# Patient Record
Sex: MALE | Race: WHITE | NOT HISPANIC OR LATINO | Employment: FULL TIME | URBAN - METROPOLITAN AREA
[De-identification: names, ages, dates, MRNs, and addresses within clinical notes are randomized per-mention and may not be internally consistent; named-entity substitution may affect disease eponyms.]

---

## 2025-03-16 ENCOUNTER — HOSPITAL ENCOUNTER (EMERGENCY)
Facility: HOSPITAL | Age: 55
Discharge: HOME | End: 2025-03-17
Attending: STUDENT IN AN ORGANIZED HEALTH CARE EDUCATION/TRAINING PROGRAM
Payer: COMMERCIAL

## 2025-03-16 DIAGNOSIS — R11.0 NAUSEA: ICD-10-CM

## 2025-03-16 DIAGNOSIS — R10.9 FLANK PAIN: ICD-10-CM

## 2025-03-16 DIAGNOSIS — K86.9 PANCREATIC LESION (HHS-HCC): Primary | ICD-10-CM

## 2025-03-16 PROCEDURE — 99285 EMERGENCY DEPT VISIT HI MDM: CPT | Performed by: STUDENT IN AN ORGANIZED HEALTH CARE EDUCATION/TRAINING PROGRAM

## 2025-03-16 RX ORDER — ONDANSETRON HYDROCHLORIDE 2 MG/ML
4 INJECTION, SOLUTION INTRAVENOUS ONCE
Status: COMPLETED | OUTPATIENT
Start: 2025-03-16 | End: 2025-03-17

## 2025-03-16 RX ORDER — MORPHINE SULFATE 4 MG/ML
4 INJECTION, SOLUTION INTRAMUSCULAR; INTRAVENOUS ONCE
Status: COMPLETED | OUTPATIENT
Start: 2025-03-16 | End: 2025-03-17

## 2025-03-16 ASSESSMENT — PAIN - FUNCTIONAL ASSESSMENT: PAIN_FUNCTIONAL_ASSESSMENT: 0-10

## 2025-03-16 ASSESSMENT — COLUMBIA-SUICIDE SEVERITY RATING SCALE - C-SSRS
6. HAVE YOU EVER DONE ANYTHING, STARTED TO DO ANYTHING, OR PREPARED TO DO ANYTHING TO END YOUR LIFE?: NO
2. HAVE YOU ACTUALLY HAD ANY THOUGHTS OF KILLING YOURSELF?: NO
1. IN THE PAST MONTH, HAVE YOU WISHED YOU WERE DEAD OR WISHED YOU COULD GO TO SLEEP AND NOT WAKE UP?: NO

## 2025-03-16 ASSESSMENT — LIFESTYLE VARIABLES
HAVE YOU EVER FELT YOU SHOULD CUT DOWN ON YOUR DRINKING: NO
EVER FELT BAD OR GUILTY ABOUT YOUR DRINKING: NO
TOTAL SCORE: 0
EVER HAD A DRINK FIRST THING IN THE MORNING TO STEADY YOUR NERVES TO GET RID OF A HANGOVER: NO
HAVE PEOPLE ANNOYED YOU BY CRITICIZING YOUR DRINKING: NO

## 2025-03-17 ENCOUNTER — APPOINTMENT (OUTPATIENT)
Dept: RADIOLOGY | Facility: HOSPITAL | Age: 55
End: 2025-03-17
Payer: COMMERCIAL

## 2025-03-17 ENCOUNTER — APPOINTMENT (OUTPATIENT)
Dept: CARDIOLOGY | Facility: HOSPITAL | Age: 55
End: 2025-03-17
Payer: COMMERCIAL

## 2025-03-17 VITALS
DIASTOLIC BLOOD PRESSURE: 82 MMHG | WEIGHT: 240 LBS | BODY MASS INDEX: 36.37 KG/M2 | OXYGEN SATURATION: 96 % | TEMPERATURE: 97.5 F | HEIGHT: 68 IN | HEART RATE: 55 BPM | RESPIRATION RATE: 18 BRPM | SYSTOLIC BLOOD PRESSURE: 141 MMHG

## 2025-03-17 LAB
ALBUMIN SERPL BCP-MCNC: 4.4 G/DL (ref 3.4–5)
ALP SERPL-CCNC: 97 U/L (ref 33–120)
ALT SERPL W P-5'-P-CCNC: 81 U/L (ref 10–52)
ANION GAP SERPL CALC-SCNC: 13 MMOL/L (ref 10–20)
APPEARANCE UR: CLEAR
AST SERPL W P-5'-P-CCNC: 42 U/L (ref 9–39)
ATRIAL RATE: 55 BPM
BASOPHILS # BLD AUTO: 0.05 X10*3/UL (ref 0–0.1)
BASOPHILS NFR BLD AUTO: 0.4 %
BILIRUB SERPL-MCNC: 0.9 MG/DL (ref 0–1.2)
BILIRUB UR STRIP.AUTO-MCNC: NEGATIVE MG/DL
BUN SERPL-MCNC: 19 MG/DL (ref 6–23)
CALCIUM SERPL-MCNC: 9.5 MG/DL (ref 8.6–10.3)
CHLORIDE SERPL-SCNC: 103 MMOL/L (ref 98–107)
CO2 SERPL-SCNC: 25 MMOL/L (ref 21–32)
COLOR UR: ABNORMAL
CREAT SERPL-MCNC: 1.08 MG/DL (ref 0.5–1.3)
EGFRCR SERPLBLD CKD-EPI 2021: 83 ML/MIN/1.73M*2
EOSINOPHIL # BLD AUTO: 0.33 X10*3/UL (ref 0–0.7)
EOSINOPHIL NFR BLD AUTO: 2.5 %
ERYTHROCYTE [DISTWIDTH] IN BLOOD BY AUTOMATED COUNT: 13.8 % (ref 11.5–14.5)
GLUCOSE SERPL-MCNC: 150 MG/DL (ref 74–99)
GLUCOSE UR STRIP.AUTO-MCNC: NORMAL MG/DL
HCT VFR BLD AUTO: 46.6 % (ref 41–52)
HGB BLD-MCNC: 15 G/DL (ref 13.5–17.5)
HOLD SPECIMEN: NORMAL
IMM GRANULOCYTES # BLD AUTO: 0.05 X10*3/UL (ref 0–0.7)
IMM GRANULOCYTES NFR BLD AUTO: 0.4 % (ref 0–0.9)
KETONES UR STRIP.AUTO-MCNC: NEGATIVE MG/DL
LEUKOCYTE ESTERASE UR QL STRIP.AUTO: NEGATIVE
LIPASE SERPL-CCNC: 16 U/L (ref 9–82)
LYMPHOCYTES # BLD AUTO: 1.96 X10*3/UL (ref 1.2–4.8)
LYMPHOCYTES NFR BLD AUTO: 15 %
MCH RBC QN AUTO: 28 PG (ref 26–34)
MCHC RBC AUTO-ENTMCNC: 32.2 G/DL (ref 32–36)
MCV RBC AUTO: 87 FL (ref 80–100)
MONOCYTES # BLD AUTO: 0.75 X10*3/UL (ref 0.1–1)
MONOCYTES NFR BLD AUTO: 5.7 %
MUCOUS THREADS #/AREA URNS AUTO: NORMAL /LPF
NEUTROPHILS # BLD AUTO: 9.96 X10*3/UL (ref 1.2–7.7)
NEUTROPHILS NFR BLD AUTO: 76 %
NITRITE UR QL STRIP.AUTO: NEGATIVE
NRBC BLD-RTO: 0 /100 WBCS (ref 0–0)
P AXIS: 43 DEGREES
P OFFSET: 194 MS
P ONSET: 133 MS
PH UR STRIP.AUTO: 5 [PH]
PLATELET # BLD AUTO: 198 X10*3/UL (ref 150–450)
POTASSIUM SERPL-SCNC: 4.7 MMOL/L (ref 3.5–5.3)
PR INTERVAL: 180 MS
PROT SERPL-MCNC: 7.6 G/DL (ref 6.4–8.2)
PROT UR STRIP.AUTO-MCNC: NEGATIVE MG/DL
Q ONSET: 223 MS
QRS COUNT: 9 BEATS
QRS DURATION: 88 MS
QT INTERVAL: 424 MS
QTC CALCULATION(BAZETT): 405 MS
QTC FREDERICIA: 411 MS
R AXIS: 49 DEGREES
RBC # BLD AUTO: 5.35 X10*6/UL (ref 4.5–5.9)
RBC # UR STRIP.AUTO: ABNORMAL MG/DL
RBC #/AREA URNS AUTO: NORMAL /HPF
SODIUM SERPL-SCNC: 136 MMOL/L (ref 136–145)
SP GR UR STRIP.AUTO: >1.05
T AXIS: 55 DEGREES
T OFFSET: 435 MS
UROBILINOGEN UR STRIP.AUTO-MCNC: NORMAL MG/DL
VENTRICULAR RATE: 55 BPM
WBC # BLD AUTO: 13.1 X10*3/UL (ref 4.4–11.3)
WBC #/AREA URNS AUTO: NORMAL /HPF

## 2025-03-17 PROCEDURE — 74177 CT ABD & PELVIS W/CONTRAST: CPT

## 2025-03-17 PROCEDURE — 96375 TX/PRO/DX INJ NEW DRUG ADDON: CPT

## 2025-03-17 PROCEDURE — 96361 HYDRATE IV INFUSION ADD-ON: CPT

## 2025-03-17 PROCEDURE — 81001 URINALYSIS AUTO W/SCOPE: CPT

## 2025-03-17 PROCEDURE — 96374 THER/PROPH/DIAG INJ IV PUSH: CPT | Mod: 59

## 2025-03-17 PROCEDURE — 76705 ECHO EXAM OF ABDOMEN: CPT | Performed by: RADIOLOGY

## 2025-03-17 PROCEDURE — 74177 CT ABD & PELVIS W/CONTRAST: CPT | Performed by: RADIOLOGY

## 2025-03-17 PROCEDURE — 93005 ELECTROCARDIOGRAM TRACING: CPT

## 2025-03-17 PROCEDURE — 2500000004 HC RX 250 GENERAL PHARMACY W/ HCPCS (ALT 636 FOR OP/ED)

## 2025-03-17 PROCEDURE — 85025 COMPLETE CBC W/AUTO DIFF WBC: CPT

## 2025-03-17 PROCEDURE — 83690 ASSAY OF LIPASE: CPT

## 2025-03-17 PROCEDURE — 76705 ECHO EXAM OF ABDOMEN: CPT

## 2025-03-17 PROCEDURE — 36415 COLL VENOUS BLD VENIPUNCTURE: CPT

## 2025-03-17 PROCEDURE — 2550000001 HC RX 255 CONTRASTS: Performed by: STUDENT IN AN ORGANIZED HEALTH CARE EDUCATION/TRAINING PROGRAM

## 2025-03-17 PROCEDURE — 80053 COMPREHEN METABOLIC PANEL: CPT

## 2025-03-17 RX ORDER — ONDANSETRON 4 MG/1
4 TABLET, ORALLY DISINTEGRATING ORAL EVERY 8 HOURS PRN
Qty: 15 TABLET | Refills: 0 | Status: SHIPPED | OUTPATIENT
Start: 2025-03-17

## 2025-03-17 RX ADMIN — SODIUM CHLORIDE, POTASSIUM CHLORIDE, SODIUM LACTATE AND CALCIUM CHLORIDE 1000 ML: 600; 310; 30; 20 INJECTION, SOLUTION INTRAVENOUS at 00:27

## 2025-03-17 RX ADMIN — MORPHINE SULFATE 4 MG: 4 INJECTION, SOLUTION INTRAMUSCULAR; INTRAVENOUS at 00:27

## 2025-03-17 RX ADMIN — ONDANSETRON 4 MG: 2 INJECTION INTRAMUSCULAR; INTRAVENOUS at 00:27

## 2025-03-17 RX ADMIN — IOHEXOL 75 ML: 350 INJECTION, SOLUTION INTRAVENOUS at 01:09

## 2025-03-17 ASSESSMENT — PAIN SCALES - GENERAL
PAINLEVEL_OUTOF10: 3
PAINLEVEL_OUTOF10: 4

## 2025-03-17 NOTE — DISCHARGE INSTRUCTIONS
Use Zofran as needed for nausea and vomiting.  You may use your home naproxen for pain control.  Focus on hydration and simple foods that you can tolerate.  If you develop uncontrollable pain or other worrisome symptoms, return to the ER.

## 2025-03-17 NOTE — ED PROVIDER NOTES
EMERGENCY DEPARTMENT ENCOUNTER      Pt Name: Gabe Rdz  MRN: [id number]  Birthdate 1970  Date of evaluation: 3/16/2025  Provider: Mejia Vo MD    CHIEF COMPLAINT       Chief Complaint   Patient presents with    Flank Pain     Pt has R sided flank pain starting around 2100. Pt is diaphoretic with 6/10 pain. Pt denies chest pain, endorses slight sob. Pt states he recently just finished abx for c diff. Pt has history of kidney stones. Pt states he has not been able to urinate; last time 1100/1200    Nausea         HISTORY OF PRESENT ILLNESS    HPI  Patient is a 54-year-old male with a history of kidney stones, HTN, HLD presenting with right flank pain.  This been ongoing since approximately 9 PM tonight.  He was sitting down not engaged in activity when this occurred.  Pain is 6/10, crampy, not rating, constant, without consistent alleviating or exacerbating factors.  He notes nausea without vomiting.  He has had sweats.  He has not been able to pee since 11 PM.  Notably, he just finished a course of oral vancomycin for C. difficile incurred from clindamycin given for a dental infection.  He denies fevers, chest pain, shortness of breath, diarrhea, constipation, black or bloody stools, dysuria, hematuria.    Nursing Notes were reviewed.    PAST MEDICAL HISTORY     Past Medical History:   Diagnosis Date    Hypertension     Kidney calculi     Other specified health status 03/14/2018    No pertinent past medical history         SURGICAL HISTORY     History reviewed. No pertinent surgical history.      CURRENT MEDICATIONS       Discharge Medication List as of 3/17/2025  2:47 AM          ALLERGIES     Penicillins    FAMILY HISTORY     No family history on file.       SOCIAL HISTORY       Social History     Socioeconomic History    Marital status:    Tobacco Use    Smoking status: Never    Smokeless tobacco: Never   Substance and Sexual Activity    Alcohol use: Not Currently    Drug use: Never     Social  Drivers of Health     Financial Resource Strain: Low Risk  (1/22/2025)    Received from [facility]    Overall Financial Resource Strain (CARDIA)     Difficulty of Paying Living Expenses: Not hard at all   Food Insecurity: No Food Insecurity (1/22/2025)    Received from [facility]    Hunger Vital Sign     Worried About Running Out of Food in the Last Year: Never true     Ran Out of Food in the Last Year: Never true   Transportation Needs: No Transportation Needs (1/22/2025)    Received from [facility]    PRAPARE - Transportation     Lack of Transportation (Medical): No     Lack of Transportation (Non-Medical): No   Physical Activity: Insufficiently Active (1/22/2025)    Received from [facility]    Exercise Vital Sign     Days of Exercise per Week: 3 days     Minutes of Exercise per Session: 40 min   Stress: No Stress Concern Present (1/22/2025)    Received from [facility]    Citizen of [place] of Occupational [employer] - Occupational Stress Questionnaire     Feeling of Stress : Not at all   Social Connections: Unknown (1/22/2025)    Received from [facility]    Social Connection and Isolation Panel [NHANES]     Frequency of Communication with Friends and Family: Patient declined     Frequency of Social Gatherings with Friends and Family: Patient declined     Attends Buddhism Services: Patient declined     Active Member of Clubs or Organizations: Patient declined     Attends Club or Organization Meetings: Patient declined     Marital Status:    Housing Stability: Low Risk  (10/11/2023)    Received from [facility]    Housing Stability Vital Sign     Unable to Pay for Housing in the Last Year: No     Number of Places Lived in the Last Year: 1     Unstable Housing in the Last Year: No       SCREENINGS                        PHYSICAL EXAM    (up to 7 for level 4, 8 or more for level 5)     ED Triage Vitals [03/16/25 4744]   Temperature Heart Rate Respirations BP   35.5 °C (95.9  °F) 65 18 (!) 190/102      Pulse Ox Temp src Heart Rate Source Patient Position   97 % -- -- --      BP Location FiO2 (%)     -- --       Physical Exam  Constitutional:       Appearance: He is not toxic-appearing.      Comments: Appears uncomfortable   HENT:      Head: Normocephalic and atraumatic.      Nose: Nose normal.      Mouth/Throat:      Mouth: Mucous membranes are moist.   Eyes:      Extraocular Movements: Extraocular movements intact.      Conjunctiva/sclera: Conjunctivae normal.   Cardiovascular:      Rate and Rhythm: Normal rate and regular rhythm.      Heart sounds: Normal heart sounds.   Pulmonary:      Effort: Pulmonary effort is normal. No respiratory distress.      Breath sounds: Normal breath sounds.   Abdominal:      General: There is no distension.      Palpations: Abdomen is soft.      Tenderness: There is right CVA tenderness. There is no left CVA tenderness or rebound.      Comments: Tender to palpation in the right upper quadrant with positive Scott sign.   Musculoskeletal:         General: No deformity or signs of injury.      Cervical back: Normal range of motion and neck supple.      Right lower leg: No edema.      Left lower leg: No edema.   Skin:     General: Skin is warm and dry.      Capillary Refill: Capillary refill takes less than 2 seconds.   Neurological:      General: No focal deficit present.          DIAGNOSTIC RESULTS     LABS:  Labs Reviewed   CBC WITH AUTO DIFFERENTIAL - Abnormal       Result Value    WBC 13.1 (*)     nRBC 0.0      RBC 5.35      Hemoglobin 15.0      Hematocrit 46.6      MCV 87      MCH 28.0      MCHC 32.2      RDW 13.8      Platelets 198      Neutrophils % 76.0      Immature Granulocytes %, Automated 0.4      Lymphocytes % 15.0      Monocytes % 5.7      Eosinophils % 2.5      Basophils % 0.4      Neutrophils Absolute 9.96 (*)     Immature Granulocytes Absolute, Automated 0.05      Lymphocytes Absolute 1.96      Monocytes Absolute 0.75      Eosinophils  Absolute 0.33      Basophils Absolute 0.05     COMPREHENSIVE METABOLIC PANEL - Abnormal    Glucose 150 (*)     Sodium 136      Potassium 4.7      Chloride 103      Bicarbonate 25      Anion Gap 13      Urea Nitrogen 19      Creatinine 1.08      eGFR 83      Calcium 9.5      Albumin 4.4      Alkaline Phosphatase 97      Total Protein 7.6      AST 42 (*)     Bilirubin, Total 0.9      ALT 81 (*)    URINALYSIS WITH REFLEX CULTURE AND MICROSCOPIC - Abnormal    Color, Urine Light-Yellow      Appearance, Urine Clear      Specific Gravity, Urine >1.050 (*)     pH, Urine 5.0      Protein, Urine NEGATIVE      Glucose, Urine Normal      Blood, Urine 0.03 (TRACE) (*)     Ketones, Urine NEGATIVE      Bilirubin, Urine NEGATIVE      Urobilinogen, Urine Normal      Nitrite, Urine NEGATIVE      Leukocyte Esterase, Urine NEGATIVE     LIPASE - Normal    Lipase 16      Narrative:     Venipuncture immediately after or during the administration of Metamizole may lead to falsely low results. Testing should be performed immediately prior to Metamizole dosing.   URINALYSIS WITH REFLEX CULTURE AND MICROSCOPIC    Narrative:     The following orders were created for panel order Urinalysis with Reflex Culture and Microscopic.  Procedure                               Abnormality         Status                     ---------                               -----------         ------                     Urinalysis with Reflex C...[[id number]]  Abnormal            Final result               Extra Urine Gray Tube[[id number]]                            In process                   Please view results for these tests on the individual orders.   EXTRA URINE GRAY TUBE   URINALYSIS MICROSCOPIC WITH REFLEX CULTURE    WBC, Urine NONE      RBC, Urine 1-2      Mucus, Urine FEW         All other labs were within normal range or not returned as of this dictation.    Imaging  US right upper quadrant   Final Result   Unremarkable right upper quadrant ultrasound.         MACRO:   None.        Signed by: Evan Finkelstein 3/17/2025 2:37 AM   Dictation workstation:   [id number]      CT abdomen pelvis w IV contrast   Final Result   1. Mild nonspecific gallbladder wall thickening near the fundus   without appreciable radiopaque stone. Recommend ultrasound for   further assessment.   2. Subtle area of low attenuation in the pancreatic head. Follow-up   MRI is recommended to exclude underlying lesion.        MACRO:   None        Signed by: Melvin Villafana 3/17/2025 1:35 AM   Dictation workstation:   [id number]           Procedures  Procedures     EMERGENCY DEPARTMENT COURSE/MDM:     ED Course as of 03/17/25 0257   Mon Mar 17, 2025   0106 EKG performed at 01: 01 and independently reviewed by provider: Reveals sinus bradycardia with a rate of 55 bpm, normal axis, normal intervals, no ST changes, no T wave abnormalities, no ectopy. No STEMI. [TL]   0140 IMPRESSION:  1. Mild nonspecific gallbladder wall thickening near the fundus  without appreciable radiopaque stone. Recommend ultrasound for  further assessment.  2. Subtle area of low attenuation in the pancreatic head. Follow-up  MRI is recommended to exclude underlying lesion.   [TL]   0152 Ultrasound to be obtained.  Patient pain is well-controlled on repeat assessment.  Patient advised to provide urine sample.  Bedside nurse made aware of need for urine sample. [TL]   0240 Ultrasound the right upper quadrant is unremarkable.  Based on this I do not suspect cholelithiasis.  Patient be discharged home.  Recommend NSAIDs and Tylenol. [TL]   0248 Patient made aware of pancreatic hypodensity need to follow-up as an outpatient for likely MRI for further evaluation.  Copy of CT report printed off and provided to patient at bedside. [TL]      ED Course User Index  [TL] Doug Lainez DO         Diagnoses as of 03/17/25 0257   Pancreatic lesion (HHS-HCC)   Flank pain   Nausea        Medical Decision Making  History obtained from the patient.   Records including labs, imaging, notes independently reviewed by me.  Presentation concerning for possible kidney stone, UTI, cholecystitis, choledocholithiasis.  Patient given Zofran, morphine with improvement in pain.  Was also given a liter fluid bolus to encourage urination.  CBC with a leukocytosis of 13.1 otherwise unremarkable.  CMP notable for mild transaminitis with AST of 42, ALT of 81.  Renal function normal.  No profound electrolyte abnormalities.  Mildly hyperglycemic to glucose of 150 thought to be clinically noncontributory.  Lipase within normal limits.  Urinalysis with trace blood only.  CT of the abdomen and pelvis demonstrated nonspecific perinephric fat stranding bilaterally, nonspecific wall thickening of the gallbladder, nonspecific lesion in the head of the pancreas..  Ultrasound of the right upper quadrant was obtained which was negative.  On reevaluation, patient is hemodynamically stable, with pain well-controlled.  Given the nonspecific perinephric fat stranding and the trace blood in the urine, it is possible he passed a stone.  Given that the patient is feeling significantly better, he is to be discharged home in satisfactory condition.  He already has medication for pain control at home.  He was given a prescription for Zofran, referral to [facility] for the incidentally found pancreatic lesion.  All questions answered and return precautions discussed.    EKG demonstrates sinus bradycardia at a rate of 55, normal intervals.  No acute injury pattern.    Patient and or family in agreement and understanding of treatment plan.  All questions answered.      I reviewed the case with the attending ED physician. The attending ED physician agrees with the plan. Patient and/or patient´s representative was counseled regarding labs, imaging, likely diagnosis, and plan. All questions were answered.    ED Medications administered this visit:    Medications   ondansetron (Zofran) injection 4  mg (4 mg intravenous Given 3/17/25 0027)   morphine injection 4 mg (4 mg intravenous Given 3/17/25 0027)   lactated Ringer's bolus 1,000 mL (0 mL intravenous Stopped 3/17/25 0234)   iohexol (OMNIPaque) 350 mg iodine/mL solution 75 mL (75 mL intravenous Given 3/17/25 0109)       New Prescriptions from this visit:    Discharge Medication List as of 3/17/2025  2:47 AM        START taking these medications    Details   ondansetron ODT (Zofran-ODT) 4 mg disintegrating tablet Dissolve 1 tablet (4 mg) in the mouth every 8 hours if needed for vomiting or nausea for up to 15 doses., Starting Mon 3/17/2025, Normal             Follow-up:  [facility]  [address]  [address] [facility] [address] [phone]  Schedule an appointment as soon as possible for a visit           Final Impression:   1. Pancreatic lesion (HHS-HCC)    2. Flank pain    3. Nausea          (Please note that portions of this note were completed with a voice recognition program.  Efforts were made to edit the dictations but occasionally words are mis-transcribed.)     Mejia Vo MD  Resident  03/17/25 0257

## 2025-03-19 ENCOUNTER — TELEPHONE (OUTPATIENT)
Dept: RADIOLOGY | Facility: HOSPITAL | Age: 55
End: 2025-03-19
Payer: COMMERCIAL

## 2025-03-19 NOTE — TELEPHONE ENCOUNTER
"Gabe is listed on the incidental findings list based on imaging completed 3/17/2025 in which radiology noted a \"subtle area of ill-defined low-attenuation in the pancreatic head measuring roughly 2.2 cm.\" Radiology recommended a follow up MRI to be completed. Gabe has a visit with Lennie Siu PA-C on 3/24/2025 from the pancreas clinic.   "

## 2025-03-24 ENCOUNTER — OFFICE VISIT (OUTPATIENT)
Dept: SURGICAL ONCOLOGY | Facility: CLINIC | Age: 55
End: 2025-03-24
Payer: COMMERCIAL

## 2025-03-24 VITALS
SYSTOLIC BLOOD PRESSURE: 153 MMHG | BODY MASS INDEX: 36.95 KG/M2 | HEART RATE: 65 BPM | DIASTOLIC BLOOD PRESSURE: 87 MMHG | WEIGHT: 243.83 LBS | HEIGHT: 68 IN

## 2025-03-24 DIAGNOSIS — D37.8 NEOPLASM OF UNCERTAIN BEHAVIOR OF HEAD OF PANCREAS: Primary | ICD-10-CM

## 2025-03-24 PROCEDURE — 99204 OFFICE O/P NEW MOD 45 MIN: CPT | Performed by: PHYSICIAN ASSISTANT

## 2025-03-24 PROCEDURE — 99214 OFFICE O/P EST MOD 30 MIN: CPT | Performed by: PHYSICIAN ASSISTANT

## 2025-03-24 PROCEDURE — 3008F BODY MASS INDEX DOCD: CPT | Performed by: PHYSICIAN ASSISTANT

## 2025-03-24 RX ORDER — ATORVASTATIN CALCIUM 80 MG/1
80 TABLET, FILM COATED ORAL
COMMUNITY
Start: 2025-01-27

## 2025-03-24 RX ORDER — MULTIVITAMIN
1 TABLET ORAL
COMMUNITY
Start: 2023-10-12

## 2025-03-24 RX ORDER — METOPROLOL SUCCINATE 25 MG/1
25 TABLET, EXTENDED RELEASE ORAL DAILY
COMMUNITY

## 2025-03-24 ASSESSMENT — PAIN SCALES - GENERAL: PAINLEVEL_OUTOF10: 0-NO PAIN

## 2025-03-24 NOTE — PROGRESS NOTES
Subjective   Mr. Rdz is a 54-year-old male who is here to discuss abnormal pancreas imaging.     He was seen in the [facility] ED on 3/16/25 with right-sided flank pain and nausea. Blood work was notable for normal lipase, AST 42, ALT 81, ALP 97, T bili 0.9 and WBC of 13.1 K.     He had a CT A/P with contrast that showed a subtle area of ill-defined low-attenuation in the pancreatic head measuring roughly 2.2 cm and mild nonspecific GB wall thickening near the fundus without appreciable stone. He then had a RUQ US that showed normal liver, normal biliary tree and normal GB.     He was referred to the [facility] who asked that I see him.     He denies a personal history of acute pancreatitis.     He was recently diagnosed with C difficile after taking Clindamycin after a dental procedure. He was treated with Vancomycin. During this episode, he lost 10 pounds. He also complains of early satiety and bloating.     He denies a known history of liver disease. He is up to date with screening colonoscopy.     Medical and surgical history: HTN, HLD, JOSIE, C difficile, JOSIE (+oral appliance).  Family history: Father had prostate cancer. No family history of pancreatic cancer.   Social history: Former smoker, quit in 2004. Rare alcohol use. No illicits. Works in IT.     Objective     There were no vitals taken for this visit.     Physical Exam  General: in no acute distress, comfortable   Eyes: no pallor or scleral icterus  Respiratory: even, unlabored  Gastrointestinal: non-distended, abdomen soft, non-tender, no masses   Musculoskeletal: normal gate, no deformities   Integumentary: no concerning lesions, no jaundice  Neurologic: no gross deficits  Psychiatric: cognition intact, mood appropriate    Assessment/Plan   Mr. Rdz is a 54-year-old male who is here to discuss abnormal pancreas imaging.     PLAN: I personally reviewed his images. There is an area of hypoattenuation in the head of the pancreas, indeterminate. I  reviewed the differential including a pancreatic mass vs cyst vs focal fat. I have ordered and scheduled a MRI/MRCP for further clarification. I will call him once the results are available.       Lennie Siu PA-C

## 2025-04-07 ENCOUNTER — HOSPITAL ENCOUNTER (OUTPATIENT)
Dept: RADIOLOGY | Facility: HOSPITAL | Age: 55
Discharge: HOME | End: 2025-04-07
Payer: COMMERCIAL

## 2025-04-07 DIAGNOSIS — D37.8 NEOPLASM OF UNCERTAIN BEHAVIOR OF HEAD OF PANCREAS: ICD-10-CM

## 2025-04-07 PROCEDURE — 2550000001 HC RX 255 CONTRASTS: Performed by: PHYSICIAN ASSISTANT

## 2025-04-07 PROCEDURE — 74183 MRI ABD W/O CNTR FLWD CNTR: CPT | Performed by: RADIOLOGY

## 2025-04-07 PROCEDURE — A9575 INJ GADOTERATE MEGLUMI 0.1ML: HCPCS | Performed by: PHYSICIAN ASSISTANT

## 2025-04-07 PROCEDURE — 76376 3D RENDER W/INTRP POSTPROCES: CPT | Performed by: RADIOLOGY

## 2025-04-07 PROCEDURE — 74183 MRI ABD W/O CNTR FLWD CNTR: CPT

## 2025-04-07 RX ORDER — GADOTERATE MEGLUMINE 376.9 MG/ML
22 INJECTION INTRAVENOUS
Status: COMPLETED | OUTPATIENT
Start: 2025-04-07 | End: 2025-04-07

## 2025-04-07 RX ADMIN — GADOTERATE MEGLUMINE 22 ML: 376.9 INJECTION INTRAVENOUS at 19:06

## 2025-05-17 ENCOUNTER — HOSPITAL ENCOUNTER (EMERGENCY)
Facility: HOSPITAL | Age: 55
Discharge: HOME | End: 2025-05-17
Attending: EMERGENCY MEDICINE
Payer: COMMERCIAL

## 2025-05-17 ENCOUNTER — APPOINTMENT (OUTPATIENT)
Dept: CARDIOLOGY | Facility: HOSPITAL | Age: 55
End: 2025-05-17
Payer: COMMERCIAL

## 2025-05-17 VITALS
HEART RATE: 68 BPM | OXYGEN SATURATION: 96 % | TEMPERATURE: 97.7 F | BODY MASS INDEX: 36.37 KG/M2 | SYSTOLIC BLOOD PRESSURE: 144 MMHG | DIASTOLIC BLOOD PRESSURE: 88 MMHG | HEIGHT: 68 IN | RESPIRATION RATE: 18 BRPM | WEIGHT: 240 LBS

## 2025-05-17 DIAGNOSIS — R03.0 ELEVATED BLOOD PRESSURE READING: ICD-10-CM

## 2025-05-17 DIAGNOSIS — R07.9 CHEST PAIN, UNSPECIFIED TYPE: Primary | ICD-10-CM

## 2025-05-17 LAB
ALBUMIN SERPL BCP-MCNC: 4.5 G/DL (ref 3.4–5)
ALP SERPL-CCNC: 98 U/L (ref 33–120)
ALT SERPL W P-5'-P-CCNC: 37 U/L (ref 10–52)
ANION GAP SERPL CALC-SCNC: 13 MMOL/L (ref 10–20)
AST SERPL W P-5'-P-CCNC: 20 U/L (ref 9–39)
BASOPHILS # BLD AUTO: 0.04 X10*3/UL (ref 0–0.1)
BASOPHILS NFR BLD AUTO: 0.4 %
BILIRUB SERPL-MCNC: 0.9 MG/DL (ref 0–1.2)
BUN SERPL-MCNC: 18 MG/DL (ref 6–23)
CALCIUM SERPL-MCNC: 9.3 MG/DL (ref 8.6–10.3)
CARDIAC TROPONIN I PNL SERPL HS: 5 NG/L (ref 0–20)
CARDIAC TROPONIN I PNL SERPL HS: 5 NG/L (ref 0–20)
CHLORIDE SERPL-SCNC: 106 MMOL/L (ref 98–107)
CO2 SERPL-SCNC: 26 MMOL/L (ref 21–32)
CREAT SERPL-MCNC: 0.93 MG/DL (ref 0.5–1.3)
EGFRCR SERPLBLD CKD-EPI 2021: >90 ML/MIN/1.73M*2
EOSINOPHIL # BLD AUTO: 0.25 X10*3/UL (ref 0–0.7)
EOSINOPHIL NFR BLD AUTO: 2.2 %
ERYTHROCYTE [DISTWIDTH] IN BLOOD BY AUTOMATED COUNT: 14.3 % (ref 11.5–14.5)
GLUCOSE SERPL-MCNC: 90 MG/DL (ref 74–99)
HCT VFR BLD AUTO: 45.8 % (ref 41–52)
HGB BLD-MCNC: 15.3 G/DL (ref 13.5–17.5)
IMM GRANULOCYTES # BLD AUTO: 0.04 X10*3/UL (ref 0–0.7)
IMM GRANULOCYTES NFR BLD AUTO: 0.4 % (ref 0–0.9)
LYMPHOCYTES # BLD AUTO: 2.71 X10*3/UL (ref 1.2–4.8)
LYMPHOCYTES NFR BLD AUTO: 24 %
MAGNESIUM SERPL-MCNC: 2.16 MG/DL (ref 1.6–2.4)
MCH RBC QN AUTO: 28.4 PG (ref 26–34)
MCHC RBC AUTO-ENTMCNC: 33.4 G/DL (ref 32–36)
MCV RBC AUTO: 85 FL (ref 80–100)
MONOCYTES # BLD AUTO: 0.79 X10*3/UL (ref 0.1–1)
MONOCYTES NFR BLD AUTO: 7 %
NEUTROPHILS # BLD AUTO: 7.48 X10*3/UL (ref 1.2–7.7)
NEUTROPHILS NFR BLD AUTO: 66 %
NRBC BLD-RTO: 0 /100 WBCS (ref 0–0)
PLATELET # BLD AUTO: 249 X10*3/UL (ref 150–450)
POTASSIUM SERPL-SCNC: 3.7 MMOL/L (ref 3.5–5.3)
PROT SERPL-MCNC: 7.7 G/DL (ref 6.4–8.2)
RBC # BLD AUTO: 5.38 X10*6/UL (ref 4.5–5.9)
SODIUM SERPL-SCNC: 141 MMOL/L (ref 136–145)
WBC # BLD AUTO: 11.3 X10*3/UL (ref 4.4–11.3)

## 2025-05-17 PROCEDURE — 84484 ASSAY OF TROPONIN QUANT: CPT | Performed by: EMERGENCY MEDICINE

## 2025-05-17 PROCEDURE — 93005 ELECTROCARDIOGRAM TRACING: CPT

## 2025-05-17 PROCEDURE — 99284 EMERGENCY DEPT VISIT MOD MDM: CPT | Performed by: EMERGENCY MEDICINE

## 2025-05-17 PROCEDURE — 36415 COLL VENOUS BLD VENIPUNCTURE: CPT | Performed by: EMERGENCY MEDICINE

## 2025-05-17 PROCEDURE — 84075 ASSAY ALKALINE PHOSPHATASE: CPT | Performed by: EMERGENCY MEDICINE

## 2025-05-17 PROCEDURE — 83735 ASSAY OF MAGNESIUM: CPT | Performed by: EMERGENCY MEDICINE

## 2025-05-17 PROCEDURE — 99285 EMERGENCY DEPT VISIT HI MDM: CPT | Performed by: EMERGENCY MEDICINE

## 2025-05-17 PROCEDURE — 85025 COMPLETE CBC W/AUTO DIFF WBC: CPT | Performed by: EMERGENCY MEDICINE

## 2025-05-17 ASSESSMENT — PAIN DESCRIPTION - LOCATION: LOCATION: CHEST

## 2025-05-17 ASSESSMENT — COLUMBIA-SUICIDE SEVERITY RATING SCALE - C-SSRS
1. IN THE PAST MONTH, HAVE YOU WISHED YOU WERE DEAD OR WISHED YOU COULD GO TO SLEEP AND NOT WAKE UP?: NO
6. HAVE YOU EVER DONE ANYTHING, STARTED TO DO ANYTHING, OR PREPARED TO DO ANYTHING TO END YOUR LIFE?: NO
2. HAVE YOU ACTUALLY HAD ANY THOUGHTS OF KILLING YOURSELF?: NO

## 2025-05-17 ASSESSMENT — PAIN SCALES - GENERAL
PAINLEVEL_OUTOF10: 0 - NO PAIN
PAINLEVEL_OUTOF10: 1

## 2025-05-17 ASSESSMENT — LIFESTYLE VARIABLES
HAVE YOU EVER FELT YOU SHOULD CUT DOWN ON YOUR DRINKING: NO
EVER FELT BAD OR GUILTY ABOUT YOUR DRINKING: NO
EVER HAD A DRINK FIRST THING IN THE MORNING TO STEADY YOUR NERVES TO GET RID OF A HANGOVER: NO
TOTAL SCORE: 0
HAVE PEOPLE ANNOYED YOU BY CRITICIZING YOUR DRINKING: NO

## 2025-05-17 ASSESSMENT — PAIN - FUNCTIONAL ASSESSMENT: PAIN_FUNCTIONAL_ASSESSMENT: 0-10

## 2025-05-17 ASSESSMENT — PAIN DESCRIPTION - DESCRIPTORS
DESCRIPTORS: PRESSURE
DESCRIPTORS: PRESSURE

## 2025-05-17 ASSESSMENT — HEART SCORE
TROPONIN: LESS THAN OR EQUAL TO NORMAL LIMIT
RISK FACTORS: 1-2 RISK FACTORS
HEART SCORE: 2
HISTORY: SLIGHTLY SUSPICIOUS
AGE: 45-64
ECG: NORMAL

## 2025-05-17 ASSESSMENT — PAIN DESCRIPTION - PAIN TYPE: TYPE: ACUTE PAIN

## 2025-05-17 NOTE — DISCHARGE INSTRUCTIONS
Please return to the ER or seek immediate medical attention if you experience new or worsening chest pain, shortness of breath, fever of 38C (100.4) or higher, persistent vomiting, weakness, numbness, tingling, excessive sweating,  loss of motion in your arms or legs, fainting, vision changes, or any new or worsening symptoms.    You are welcome back any time. Thank you for entrusting your care to us, I hope we made your visit as pleasant as possible. Wishing you well!    Dr. Manuel

## 2025-05-18 LAB
ATRIAL RATE: 55 BPM
ATRIAL RATE: 58 BPM
P AXIS: 36 DEGREES
P AXIS: 39 DEGREES
P OFFSET: 175 MS
P OFFSET: 199 MS
P ONSET: 124 MS
P ONSET: 133 MS
PR INTERVAL: 166 MS
PR INTERVAL: 180 MS
Q ONSET: 207 MS
Q ONSET: 223 MS
QRS COUNT: 10 BEATS
QRS COUNT: 9 BEATS
QRS DURATION: 82 MS
QRS DURATION: 84 MS
QT INTERVAL: 402 MS
QT INTERVAL: 406 MS
QTC CALCULATION(BAZETT): 388 MS
QTC CALCULATION(BAZETT): 394 MS
QTC FREDERICIA: 394 MS
QTC FREDERICIA: 397 MS
R AXIS: 35 DEGREES
R AXIS: 37 DEGREES
T AXIS: 53 DEGREES
T AXIS: 55 DEGREES
T OFFSET: 408 MS
T OFFSET: 426 MS
VENTRICULAR RATE: 55 BPM
VENTRICULAR RATE: 58 BPM

## 2025-05-18 NOTE — ED PROVIDER NOTES
EMERGENCY DEPARTMENT ENCOUNTER      Pt Name: Gabe Rdz  MRN: [id number]  Birthdate 1970  Date of evaluation: 5/17/2025  Provider: Camilo Manuel DO    CHIEF COMPLAINT       Chief Complaint   Patient presents with    Hypertension     Pt reports hiving spikes in bp this week seen by pcp, told to come in for elevated troponin of 14.     Chest Pain     1/10 chest pressure during bp spikes     HISTORY OF PRESENT ILLNESS    Gabe Rdz is a 54 y.o. year old male who presents to the ER for elevated blood pressure.  Reports that his blood pressure has been high all week, up to 167/93.  3 weeks ago his primary care provider, Dr. Mccann, increased his metoprolol from 25 mg to 37.5 mg once daily.  Monday metoprolol again increased from 37.5 to 50 mg daily.  Yesterday went and saw Dr. Spring at [facility], who ran a troponin I which resulted at 14, he was told to follow-up in the ER.  He went to [facility], had a chest x-ray and EKG, no labs were drawn, after 5 hours of waiting he left.  He reports currently he is asymptomatic, denies fever, chest pain, shortness of breath, nausea or vomiting, fainting, vision changes, headache, changes to urine or stool.  Reports that earlier today he did have chest tightness which has resolved on its own.  Reports baseline heart rate in the 50s.    PMH HTN, HLD, nephrolithiasis  PSH none  Family history mother-MI in her late 60s or early 70s, father-MI in his 70s  Allergies penicillin, endorses occasional alcohol use, denies tobacco or drug use  Reports he has seen a cardiologist in the past, does not currently have one.     PAST MEDICAL HISTORY   Medical History[1]  CURRENT MEDICATIONS       Previous Medications    ATORVASTATIN (LIPITOR) 80 MG TABLET    Take 1 tablet (80 mg) by mouth once daily.    METOPROLOL SUCCINATE XL (TOPROL-XL) 25 MG 24 HR TABLET    Take 1 tablet (25 mg) by mouth once daily.    MULTIVITAMIN TABLET    Take 1 tablet by mouth once daily.    ONDANSETRON ODT  (ZOFRAN-ODT) 4 MG DISINTEGRATING TABLET    Dissolve 1 tablet (4 mg) in the mouth every 8 hours if needed for vomiting or nausea for up to 15 doses.     SURGICAL HISTORY     Surgical History[2]  ALLERGIES     Penicillins  FAMILY HISTORY     Family History[3]  SOCIAL HISTORY     Social History[4]  PHYSICAL EXAM  (up to 7 for level 4, 8 or more for level 5)     ED Triage Vitals [05/17/25 1550]   Temperature Heart Rate Respirations BP   36.2 °C (97.1 °F) 74 18 (!) 164/95      Pulse Ox Temp Source Heart Rate Source Patient Position   96 % Temporal Monitor Sitting      BP Location FiO2 (%)     Right arm --       Physical Exam  Vitals and nursing note reviewed.   Constitutional:       General: He is not in acute distress.     Appearance: Normal appearance. He is not ill-appearing.   HENT:      Head: Normocephalic and atraumatic. No raccoon eyes, Lin's sign, contusion or laceration.      Jaw: No trismus or malocclusion.      Right Ear: External ear normal.      Left Ear: External ear normal.      Mouth/Throat:      Mouth: Mucous membranes are moist.      Pharynx: Oropharynx is clear.   Eyes:      Extraocular Movements: Extraocular movements intact.      Pupils: Pupils are equal, round, and reactive to light.   Neck:      Trachea: No tracheal deviation.   Cardiovascular:      Rate and Rhythm: Regular rhythm. Bradycardia present.      Pulses: Normal pulses.   Pulmonary:      Effort: No respiratory distress.      Breath sounds: No wheezing, rhonchi or rales.   Chest:      Chest wall: No tenderness.   Abdominal:      General: Abdomen is flat.      Palpations: Abdomen is soft. There is no mass.      Tenderness: There is no abdominal tenderness.   Musculoskeletal:         General: No tenderness or signs of injury.      Right lower leg: No edema.      Left lower leg: No edema.   Skin:     Coloration: Skin is not jaundiced or pale.      Findings: No petechiae, rash or wound.   Neurological:      Mental Status: He is alert.       Sensory: No sensory deficit.      Motor: No weakness.        DIAGNOSTIC RESULTS   LABS:  Labs Reviewed   COMPREHENSIVE METABOLIC PANEL - Normal       Result Value    Glucose 90      Sodium 141      Potassium 3.7      Chloride 106      Bicarbonate 26      Anion Gap 13      Urea Nitrogen 18      Creatinine 0.93      eGFR >90      Calcium 9.3      Albumin 4.5      Alkaline Phosphatase 98      Total Protein 7.7      AST 20      Bilirubin, Total 0.9      ALT 37     MAGNESIUM - Normal    Magnesium 2.16     SERIAL TROPONIN-INITIAL - Normal    Troponin I, High Sensitivity 5      Narrative:     Less than 99th percentile of normal range cutoff-  Female and children under 18 years old <14 ng/L; Male <21 ng/L: Negative  Repeat testing should be performed if clinically indicated.     Female and children under 18 years old 14-50 ng/L; Male 21-50 ng/L:  Consistent with possible cardiac damage and possible increased clinical   risk. Serial measurements may help to assess extent of myocardial damage.     >50 ng/L: Consistent with cardiac damage, increased clinical risk and  myocardial infarction. Serial measurements may help assess extent of   myocardial damage.      NOTE: Children less than 1 year old may have higher baseline troponin   levels and results should be interpreted in conjunction with the overall   clinical context.     NOTE: Troponin I testing is performed using a different   testing methodology at [facility] than at other   [facility]. Direct result comparisons should only   be made within the same method.   SERIAL TROPONIN, 1 HOUR - Normal    Troponin I, High Sensitivity 5      Narrative:     Less than 99th percentile of normal range cutoff-  Female and children under 18 years old <14 ng/L; Male <21 ng/L: Negative  Repeat testing should be performed if clinically indicated.     Female and children under 18 years old 14-50 ng/L; Male 21-50 ng/L:  Consistent with possible cardiac damage and possible  increased clinical   risk. Serial measurements may help to assess extent of myocardial damage.     >50 ng/L: Consistent with cardiac damage, increased clinical risk and  myocardial infarction. Serial measurements may help assess extent of   myocardial damage.      NOTE: Children less than 1 year old may have higher baseline troponin   levels and results should be interpreted in conjunction with the overall   clinical context.     NOTE: Troponin I testing is performed using a different   testing methodology at [facility] than at other   [facility]. Direct result comparisons should only   be made within the same method.   TROPONIN SERIES- (INITIAL, 1 HR)    Narrative:     The following orders were created for panel order Troponin I Series, High Sensitivity (0, 1 HR).  Procedure                               Abnormality         Status                     ---------                               -----------         ------                     Troponin I, High Sensiti...[[id number]]  Normal              Final result               Troponin, High Sensitivi...[[id number]]  Normal              Final result                 Please view results for these tests on the individual orders.   CBC WITH AUTO DIFFERENTIAL    WBC 11.3      nRBC 0.0      RBC 5.38      Hemoglobin 15.3      Hematocrit 45.8      MCV 85      MCH 28.4      MCHC 33.4      RDW 14.3      Platelets 249      Neutrophils % 66.0      Immature Granulocytes %, Automated 0.4      Lymphocytes % 24.0      Monocytes % 7.0      Eosinophils % 2.2      Basophils % 0.4      Neutrophils Absolute 7.48      Immature Granulocytes Absolute, Automated 0.04      Lymphocytes Absolute 2.71      Monocytes Absolute 0.79      Eosinophils Absolute 0.25      Basophils Absolute 0.04       All other labs were within normal range or not returned as of this dictation.  Imaging  No orders to display      Procedure  Procedures  EMERGENCY DEPARTMENT COURSE/MDM:   Medical Decision  "Making    Vitals:    Vitals:    05/17/25 1550 05/17/25 1726   BP: (!) 164/95 150/88   BP Location: Right arm Right arm   Patient Position: Sitting Sitting   Pulse: 74 63   Resp: 18 18   Temp: 36.2 °C (97.1 °F)    TempSrc: Temporal    SpO2: 96% 95%   Weight: 109 kg (240 lb)    Height: 1.727 m (5' 8\")      Gabe Rdz is a male 54 y.o. who presents to the ER for elevated blood pressure and transient resolved chest pain. On arrival the patients vital signs were: Afebrile, regular heart rate, normotensive, regular respiration rate, normoxic on room air. History obtained from: patient.  Exam without evidence of volume overload so doubt heart failure. No chest pain or or neurologic deficit, not hypertensive, I doubt acute aortic dissection. No hypotension, JVD, muffled heart sounds, absent breath sounds, or electrical alternans, doubt tension pneumothorax or cardiac tamponade. No abdominal pain or tenderness, again not hypertensive, doubt ruptured aneurysm. No fevers, cough, recent URI symptoms, recent cardiac surgery, doubt pericarditis. No emesis, doubt [place]. Plan for cardiac workup to assess for ACS, pneumothorax, infection.    ED Course as of 05/17/25 2008   Sat May 17, 2025   1731 EKG: Sinus rhythm at 55 bpm.  .  QRS 82.  QTc 388.  Normal axis.  No ST elevations, no acute ischemic pattern [PS]   2005 CBC and Auto Differential  No acute leukocytosis, leukopenia, thrombocytosis, thrombocytopenia, or anemia [CB]   2007 Comprehensive Metabolic Panel  Normoglycemic, no acute electrolyte or hepatorenal abnormality [CB]   2007 Troponin I, High Sensitivity: 5  Not elevated doubt acs or myopericarditis [CB]   2007 Troponin I, High Sensitivity: 5  Not elevated up to 15 points relative to initial troponin, doubt doubt acs or myopericarditis [CB]   2008 On chart review chest x-ray from last night unremarkable. [CB]   2008 Heart score 2 for age, Hx HTN and HLD.  [CB]      ED Course User Index  [CB] Camilo Manuel, "   [PS] Tom Carranza DO         Diagnoses as of 05/17/25 2008   Chest pain, unspecified type   Elevated blood pressure reading     Diagnostic testing considered but not performed: Chest x-ray from last night unremarkable, repeat deferred.  External Records Reviewed: I reviewed recent and relevant outside records including inpatient notes, outpatient records    Shared decision making for disposition  Patient and/or patient´s representative was counseled regarding labs, imaging, likely diagnosis. All questions were answered. Recommendation was made   for discharge home. The patient agreed and was discharged home in stable condition with appropriate relevant educational materials. Return precautions were provided which included new or worsening chest pain, shortness of breath, fever of 38C (100.4) or higher, persistent vomiting, weakness, numbness, tingling, excessive sweating,  loss of motion in your arms or legs, fainting, vision changes, or any new or worsening symptoms..     ED Medications administered this visit:  Medications - No data to display    New Prescriptions from this visit:    New Prescriptions    No medications on file       Follow-up:  Adelia Mccann DO  [id number] [id number] [phone]              Final Impression:   1. Chest pain, unspecified type    2. Elevated blood pressure reading          Please excuse any misspellings or unintended errors related to the Dragon speech recognition software used to dictate this note.    I reviewed the case with the attending ED physician. The attending ED physician agrees with the plan.        [1]   Past Medical History:  Diagnosis Date    Hypertension     Kidney calculi     Other specified health status 03/14/2018    No pertinent past medical history   [2] History reviewed. No pertinent surgical history.  [3] No family history on file.  [4]   Social History  Tobacco Use    Smoking status: Never    Smokeless tobacco: Never    Substance Use Topics    Alcohol use: Not Currently    Drug use: Never        Camilo Manuel DO  Resident  05/17/25 2008

## 2025-06-09 LAB
ATRIAL RATE: 55 BPM
P AXIS: 43 DEGREES
P OFFSET: 194 MS
P ONSET: 133 MS
PR INTERVAL: 180 MS
Q ONSET: 223 MS
QRS COUNT: 9 BEATS
QRS DURATION: 88 MS
QT INTERVAL: 424 MS
QTC CALCULATION(BAZETT): 405 MS
QTC FREDERICIA: 411 MS
R AXIS: 49 DEGREES
T AXIS: 55 DEGREES
T OFFSET: 435 MS
VENTRICULAR RATE: 55 BPM

## 2025-06-13 ENCOUNTER — APPOINTMENT (OUTPATIENT)
Dept: CARDIOLOGY | Facility: CLINIC | Age: 55
End: 2025-06-13
Payer: COMMERCIAL

## 2025-06-13 VITALS
OXYGEN SATURATION: 97 % | WEIGHT: 239.8 LBS | HEIGHT: 68 IN | BODY MASS INDEX: 36.34 KG/M2 | DIASTOLIC BLOOD PRESSURE: 80 MMHG | SYSTOLIC BLOOD PRESSURE: 146 MMHG | RESPIRATION RATE: 18 BRPM | HEART RATE: 72 BPM

## 2025-06-13 DIAGNOSIS — I10 ESSENTIAL (PRIMARY) HYPERTENSION: ICD-10-CM

## 2025-06-13 DIAGNOSIS — E78.2 MIXED HYPERLIPIDEMIA: ICD-10-CM

## 2025-06-13 DIAGNOSIS — R07.9 EXERTIONAL CHEST PAIN: Primary | ICD-10-CM

## 2025-06-13 DIAGNOSIS — R07.9 CHEST PAIN, UNSPECIFIED TYPE: ICD-10-CM

## 2025-06-13 DIAGNOSIS — R79.89 ELEVATED TROPONIN: ICD-10-CM

## 2025-06-13 PROBLEM — E78.5 HLD (HYPERLIPIDEMIA): Status: ACTIVE | Noted: 2025-06-13

## 2025-06-13 PROBLEM — R73.01 ELEVATED FASTING GLUCOSE: Status: ACTIVE | Noted: 2019-08-12

## 2025-06-13 PROBLEM — Z20.822 SUSPECTED SEVERE ACUTE RESPIRATORY SYNDROME CORONAVIRUS 2 (SARS-COV-2) INFECTION: Status: ACTIVE | Noted: 2025-06-13

## 2025-06-13 PROBLEM — R07.89 CHEST DISCOMFORT: Status: ACTIVE | Noted: 2025-06-13

## 2025-06-13 PROBLEM — Z86.19 HISTORY OF CLOSTRIDIOIDES DIFFICILE INFECTION: Status: ACTIVE | Noted: 2025-02-28

## 2025-06-13 PROBLEM — R07.89 CHEST PRESSURE: Status: ACTIVE | Noted: 2025-06-13

## 2025-06-13 PROBLEM — Z20.822 SUSPECTED COVID-19 VIRUS INFECTION: Status: ACTIVE | Noted: 2025-06-13

## 2025-06-13 PROCEDURE — 3008F BODY MASS INDEX DOCD: CPT | Performed by: NURSE PRACTITIONER

## 2025-06-13 PROCEDURE — 3079F DIAST BP 80-89 MM HG: CPT | Performed by: NURSE PRACTITIONER

## 2025-06-13 PROCEDURE — 93000 ELECTROCARDIOGRAM COMPLETE: CPT | Performed by: NURSE PRACTITIONER

## 2025-06-13 PROCEDURE — 1036F TOBACCO NON-USER: CPT | Performed by: NURSE PRACTITIONER

## 2025-06-13 PROCEDURE — 3077F SYST BP >= 140 MM HG: CPT | Performed by: NURSE PRACTITIONER

## 2025-06-13 PROCEDURE — 99204 OFFICE O/P NEW MOD 45 MIN: CPT | Performed by: NURSE PRACTITIONER

## 2025-06-13 RX ORDER — ASPIRIN 81 MG/1
81 TABLET ORAL DAILY
COMMUNITY

## 2025-06-13 RX ORDER — AMLODIPINE BESYLATE 10 MG/1
10 TABLET ORAL DAILY
COMMUNITY

## 2025-06-13 NOTE — PROGRESS NOTES
Name : Gabe Rdz   : 1970   MRN : [id number]   ENC Date : 2025    CC:   Hypertension, Sleep Apnea, Hyperlipidemia, Obesity, CV Evaluation, Chest Pain, and Shortness of Breath     HPI:    Gabe Rdz is a 54 y.o. male with PMHx HTN, HLD & JOSIE intolerant of CPAP/using oral appliance who presents today as above.    [facility]. He complained of chest pain at PCP office. PCP ordered troponin which came back at 14 & he was advised to go to ED. EKG done, they couldn't get a vein to repeat troponin, plan was for admission, he waited for 5 hrs with no bed so he went home.    Then went to [facility] 24 2/2 chest pain & HTN. Repeat troponin 5 & 5. Workup unremarkable    In between 24 & now his metoprolol was doubled to 50mg every day & he was started on amlodipine with titration to max dose    Was going to the gym 4-5 days a week  30-35mins of cardio on a bike  Once a week weights    4 spikes in blood pressure while working. Checked BP when he got home. Prompting check was pressure left chest & flushed face while working out. Rested for 5 mins before pressure went away.    Diet laden in sodium: yesterday he had lamb & then went out to dinner for wings.    He also drinks a 5hr energy drink a day     Former smoker, quit 7-8 yrs doing a pack a week at least & 1/2 PPC at worst  x 20 yrs    A1C 5.5   on max atorvastatin    Family history mother-MI in her late 60s or early 70s, father-MI in his 70s     CV Diagnostics:  EKG is completely normal     ROS: unless otherwise noted in the history of present illness, all other systems were reviewed and they are negative for complaints     PMH:  none    PSH:  none    SHx:  He reports that he has quit smoking. His smoking use included cigarettes. He has never used smokeless tobacco. He reports current alcohol use of about 1.0 - 2.0 standard drink of alcohol per week. He reports that he does not use drugs.    FHx:  As above    "  Allergies:  Penicillins    Outpatient Medications:  Current Outpatient Medications   Medication Instructions    amLODIPine (NORVASC) 10 mg, Daily    aspirin 81 mg, Daily    atorvastatin (LIPITOR) 80 mg, Daily RT    metoprolol succinate XL (TOPROL-XL) 25 mg, Daily    multivitamin tablet 1 tablet, Daily RT     Last Recorded Vitals:  Vitals:    06/13/25 0905   BP: 146/80   BP Location: Left arm   Patient Position: Sitting   Pulse: 72   Resp: 18   SpO2: 97%   Weight: 109 kg (239 lb 12.8 oz)   Height: 1.727 m (5' 8\")     Physical Exam:  On exam Mr. Gabe Rdz appears his stated age, is alert and oriented x3, and in no acute distress. His sclera are anicteric and his oropharynx has moist mucous membranes. His neck is supple and without thyromegaly. The JVP is ~5 cm of water above the right atrium. His cardiac exam has regular rhythm, normal S1, S2. No S3/4. There are no murmurs. His lungs are clear to auscultation bilaterally and there is no dullness to percussion. His abdomen is soft, nontender with normoactive bowel sounds. There is no HJR. The extremities are warm and without edema. The skin is dry. There is no rash present. The distal pulses are 2-3+ in all four extremities. His mood and affect are appropriate for todays encounter.      Last Labs:  CBC -  Lab Results   Component Value Date    WBC 11.3 05/17/2025    HGB 15.3 05/17/2025    HCT 45.8 05/17/2025    MCV 85 05/17/2025     05/17/2025       CMP -  Lab Results   Component Value Date    CALCIUM 9.3 05/17/2025    PROT 7.7 05/17/2025    ALBUMIN 4.5 05/17/2025    AST 20 05/17/2025    ALT 37 05/17/2025    ALKPHOS 98 05/17/2025    BILITOT 0.9 05/17/2025       LIPID PANEL -   No results found for: \"CHOL\", \"TRIG\", \"HDL\", \"CHHDL\", \"LDLF\", \"VLDL\", \"NHDL\"    RENAL FUNCTION PANEL -   Lab Results   Component Value Date    GLUCOSE 90 05/17/2025     05/17/2025    K 3.7 05/17/2025     05/17/2025    CO2 26 05/17/2025    ANIONGAP 13 05/17/2025    BUN 18 " 05/17/2025    CREATININE 0.93 05/17/2025    CALCIUM 9.3 05/17/2025    ALBUMIN 4.5 05/17/2025        Lab Results   Component Value Date    HGBA1C 5.5 10/13/2023     I have personally reviewed the above lab results: CBC, chemistry, other labs as you see listed & diagnostics, I have specifically listed the results of these tests above.    Assessment/Plan:  Hypertension. /80 mmHg today. Ate wings at restaurant last night. Started taking metop & amlodpine at night over this past week as he felt wiped out during day & associated this with his amlodipine. This helped him sleep at night & no longer feels that way. Discussed likely the increase in metoprolol.  - for now, continue current medication. Fix diet. No more than 2g sodium in a day  - re-evaluate in 2-3 weeks    2.  Chest pain with exertion  3.  Elevated troponin  EKG without ischemic changes. Trops down trended from 14 to 5. Suspect demand ischemia with HTN & under treated JOSIE. However, cannot r/o ischemic disease. Will get exercise stress test.    4.  Hyperlipidemia. Relatively controlled on Atorvastatin 80mg. .    Follow up after stress test    Tracy M Schwab, APRN-CNP

## 2025-06-27 ENCOUNTER — HOSPITAL ENCOUNTER (OUTPATIENT)
Dept: CARDIOLOGY | Facility: HOSPITAL | Age: 55
Discharge: HOME | End: 2025-06-27
Payer: COMMERCIAL

## 2025-06-27 ENCOUNTER — HOSPITAL ENCOUNTER (OUTPATIENT)
Facility: HOSPITAL | Age: 55
Setting detail: OUTPATIENT SURGERY
Discharge: HOME | End: 2025-06-27
Attending: INTERNAL MEDICINE | Admitting: INTERNAL MEDICINE
Payer: COMMERCIAL

## 2025-06-27 ENCOUNTER — PHARMACY VISIT (OUTPATIENT)
Dept: PHARMACY | Facility: CLINIC | Age: 55
End: 2025-06-27
Payer: COMMERCIAL

## 2025-06-27 VITALS
DIASTOLIC BLOOD PRESSURE: 87 MMHG | SYSTOLIC BLOOD PRESSURE: 148 MMHG | BODY MASS INDEX: 36.22 KG/M2 | HEIGHT: 68 IN | OXYGEN SATURATION: 97 % | RESPIRATION RATE: 16 BRPM | HEART RATE: 70 BPM | WEIGHT: 239 LBS

## 2025-06-27 DIAGNOSIS — R94.39 POSITIVE CARDIAC STRESS TEST: ICD-10-CM

## 2025-06-27 DIAGNOSIS — I20.9 ANGINA PECTORIS, UNSPECIFIED: ICD-10-CM

## 2025-06-27 DIAGNOSIS — R79.89 ELEVATED TROPONIN: Primary | ICD-10-CM

## 2025-06-27 DIAGNOSIS — R93.1 ABNORMAL FINDINGS ON DIAGNOSTIC IMAGING OF HEART AND CORONARY CIRCULATION: ICD-10-CM

## 2025-06-27 DIAGNOSIS — R07.9 CHEST PAIN, UNSPECIFIED TYPE: ICD-10-CM

## 2025-06-27 LAB
ACT BLD: 327 SEC (ref 83–199)
ANION GAP SERPL CALC-SCNC: 14 MMOL/L (ref 10–20)
BUN SERPL-MCNC: 19 MG/DL (ref 6–23)
CALCIUM SERPL-MCNC: 9.6 MG/DL (ref 8.6–10.3)
CHLORIDE SERPL-SCNC: 102 MMOL/L (ref 98–107)
CO2 SERPL-SCNC: 26 MMOL/L (ref 21–32)
CREAT SERPL-MCNC: 1 MG/DL (ref 0.5–1.3)
EGFRCR SERPLBLD CKD-EPI 2021: >90 ML/MIN/1.73M*2
ERYTHROCYTE [DISTWIDTH] IN BLOOD BY AUTOMATED COUNT: 13.4 % (ref 11.5–14.5)
GLUCOSE SERPL-MCNC: 96 MG/DL (ref 74–99)
HCT VFR BLD AUTO: 43.3 % (ref 41–52)
HGB BLD-MCNC: 14.7 G/DL (ref 13.5–17.5)
MCH RBC QN AUTO: 28.8 PG (ref 26–34)
MCHC RBC AUTO-ENTMCNC: 33.9 G/DL (ref 32–36)
MCV RBC AUTO: 85 FL (ref 80–100)
NRBC BLD-RTO: 0 /100 WBCS (ref 0–0)
PLATELET # BLD AUTO: 228 X10*3/UL (ref 150–450)
POTASSIUM SERPL-SCNC: 3.9 MMOL/L (ref 3.5–5.3)
RBC # BLD AUTO: 5.1 X10*6/UL (ref 4.5–5.9)
SODIUM SERPL-SCNC: 138 MMOL/L (ref 136–145)
WBC # BLD AUTO: 9.6 X10*3/UL (ref 4.4–11.3)

## 2025-06-27 PROCEDURE — 85347 COAGULATION TIME ACTIVATED: CPT | Performed by: INTERNAL MEDICINE

## 2025-06-27 PROCEDURE — RXMED WILLOW AMBULATORY MEDICATION CHARGE

## 2025-06-27 PROCEDURE — G0269 OCCLUSIVE DEVICE IN VEIN ART: HCPCS | Mod: 59 | Performed by: INTERNAL MEDICINE

## 2025-06-27 PROCEDURE — 80048 BASIC METABOLIC PNL TOTAL CA: CPT | Performed by: INTERNAL MEDICINE

## 2025-06-27 PROCEDURE — 7100000010 HC PHASE TWO TIME - EACH INCREMENTAL 1 MINUTE: Performed by: INTERNAL MEDICINE

## 2025-06-27 PROCEDURE — 2720000007 HC OR 272 NO HCPCS: Performed by: INTERNAL MEDICINE

## 2025-06-27 PROCEDURE — C1769 GUIDE WIRE: HCPCS | Performed by: INTERNAL MEDICINE

## 2025-06-27 PROCEDURE — 99153 MOD SED SAME PHYS/QHP EA: CPT | Performed by: INTERNAL MEDICINE

## 2025-06-27 PROCEDURE — 92928 PRQ TCAT PLMT NTRAC ST 1 LES: CPT | Performed by: INTERNAL MEDICINE

## 2025-06-27 PROCEDURE — 2550000001 HC RX 255 CONTRASTS: Mod: JW | Performed by: INTERNAL MEDICINE

## 2025-06-27 PROCEDURE — 92978 ENDOLUMINL IVUS OCT C 1ST: CPT | Performed by: INTERNAL MEDICINE

## 2025-06-27 PROCEDURE — 2500000002 HC RX 250 W HCPCS SELF ADMINISTERED DRUGS (ALT 637 FOR MEDICARE OP, ALT 636 FOR OP/ED): Performed by: INTERNAL MEDICINE

## 2025-06-27 PROCEDURE — C1760 CLOSURE DEV, VASC: HCPCS | Performed by: INTERNAL MEDICINE

## 2025-06-27 PROCEDURE — 7100000009 HC PHASE TWO TIME - INITIAL BASE CHARGE: Performed by: INTERNAL MEDICINE

## 2025-06-27 PROCEDURE — 93458 L HRT ARTERY/VENTRICLE ANGIO: CPT | Performed by: INTERNAL MEDICINE

## 2025-06-27 PROCEDURE — C1887 CATHETER, GUIDING: HCPCS | Performed by: INTERNAL MEDICINE

## 2025-06-27 PROCEDURE — C1725 CATH, TRANSLUMIN NON-LASER: HCPCS | Performed by: INTERNAL MEDICINE

## 2025-06-27 PROCEDURE — 85347 COAGULATION TIME ACTIVATED: CPT

## 2025-06-27 PROCEDURE — 93017 CV STRESS TEST TRACING ONLY: CPT

## 2025-06-27 PROCEDURE — C1753 CATH, INTRAVAS ULTRASOUND: HCPCS | Performed by: INTERNAL MEDICINE

## 2025-06-27 PROCEDURE — 96373 THER/PROPH/DIAG INJ IA: CPT | Performed by: INTERNAL MEDICINE

## 2025-06-27 PROCEDURE — 2500000001 HC RX 250 WO HCPCS SELF ADMINISTERED DRUGS (ALT 637 FOR MEDICARE OP): Performed by: INTERNAL MEDICINE

## 2025-06-27 PROCEDURE — 99152 MOD SED SAME PHYS/QHP 5/>YRS: CPT | Performed by: INTERNAL MEDICINE

## 2025-06-27 PROCEDURE — 36415 COLL VENOUS BLD VENIPUNCTURE: CPT | Performed by: INTERNAL MEDICINE

## 2025-06-27 PROCEDURE — 85027 COMPLETE CBC AUTOMATED: CPT | Performed by: INTERNAL MEDICINE

## 2025-06-27 PROCEDURE — C9600 PERC DRUG-EL COR STENT SING: HCPCS | Mod: LD | Performed by: INTERNAL MEDICINE

## 2025-06-27 PROCEDURE — 2500000004 HC RX 250 GENERAL PHARMACY W/ HCPCS (ALT 636 FOR OP/ED): Performed by: INTERNAL MEDICINE

## 2025-06-27 PROCEDURE — 2780000003 HC OR 278 NO HCPCS: Performed by: INTERNAL MEDICINE

## 2025-06-27 PROCEDURE — 92978 ENDOLUMINL IVUS OCT C 1ST: CPT | Mod: LD | Performed by: INTERNAL MEDICINE

## 2025-06-27 PROCEDURE — C1874 STENT, COATED/COV W/DEL SYS: HCPCS | Performed by: INTERNAL MEDICINE

## 2025-06-27 PROCEDURE — C1894 INTRO/SHEATH, NON-LASER: HCPCS | Performed by: INTERNAL MEDICINE

## 2025-06-27 PROCEDURE — 93005 ELECTROCARDIOGRAM TRACING: CPT | Mod: 59

## 2025-06-27 DEVICE — EVEROLIMUS-ELUTING PLATINUM CHROMIUM CORONARY STENT SYSTEM
Type: IMPLANTABLE DEVICE | Site: CORONARY | Status: FUNCTIONAL
Brand: SYNERGY MEGATRON™

## 2025-06-27 RX ORDER — TICAGRELOR 90 MG/1
90 TABLET, FILM COATED ORAL 2 TIMES DAILY
Qty: 60 TABLET | Refills: 3 | Status: SHIPPED | OUTPATIENT
Start: 2025-06-27 | End: 2025-07-01 | Stop reason: ALTCHOICE

## 2025-06-27 RX ORDER — METOPROLOL SUCCINATE 25 MG/1
50 TABLET, EXTENDED RELEASE ORAL DAILY
Qty: 180 TABLET | Refills: 3 | Status: SHIPPED | OUTPATIENT
Start: 2025-06-27

## 2025-06-27 RX ORDER — NICARDIPINE HYDROCHLORIDE 0.2 MG/ML
INJECTION INTRAVENOUS AS NEEDED
Status: DISCONTINUED | OUTPATIENT
Start: 2025-06-27 | End: 2025-06-27 | Stop reason: HOSPADM

## 2025-06-27 RX ORDER — TICAGRELOR 90 MG/1
TABLET, FILM COATED ORAL AS NEEDED
Status: DISCONTINUED | OUTPATIENT
Start: 2025-06-27 | End: 2025-06-27 | Stop reason: HOSPADM

## 2025-06-27 RX ORDER — FENTANYL CITRATE 50 UG/ML
INJECTION, SOLUTION INTRAMUSCULAR; INTRAVENOUS AS NEEDED
Status: DISCONTINUED | OUTPATIENT
Start: 2025-06-27 | End: 2025-06-27 | Stop reason: HOSPADM

## 2025-06-27 RX ORDER — LIDOCAINE HYDROCHLORIDE 20 MG/ML
INJECTION, SOLUTION INFILTRATION; PERINEURAL AS NEEDED
Status: DISCONTINUED | OUTPATIENT
Start: 2025-06-27 | End: 2025-06-27 | Stop reason: HOSPADM

## 2025-06-27 RX ORDER — ASPIRIN 325 MG
TABLET ORAL AS NEEDED
Status: DISCONTINUED | OUTPATIENT
Start: 2025-06-27 | End: 2025-06-27 | Stop reason: HOSPADM

## 2025-06-27 RX ORDER — MIDAZOLAM HYDROCHLORIDE 1 MG/ML
INJECTION, SOLUTION INTRAMUSCULAR; INTRAVENOUS AS NEEDED
Status: DISCONTINUED | OUTPATIENT
Start: 2025-06-27 | End: 2025-06-27 | Stop reason: HOSPADM

## 2025-06-27 RX ORDER — HEPARIN SODIUM 1000 [USP'U]/ML
INJECTION, SOLUTION INTRAVENOUS; SUBCUTANEOUS AS NEEDED
Status: DISCONTINUED | OUTPATIENT
Start: 2025-06-27 | End: 2025-06-27 | Stop reason: HOSPADM

## 2025-06-27 RX ORDER — NITROGLYCERIN 5 MG/ML
INJECTION, SOLUTION INTRAVENOUS AS NEEDED
Status: DISCONTINUED | OUTPATIENT
Start: 2025-06-27 | End: 2025-06-27 | Stop reason: HOSPADM

## 2025-06-27 ASSESSMENT — PAIN SCALES - GENERAL
PAINLEVEL_OUTOF10: 5 - MODERATE PAIN
PAINLEVEL_OUTOF10: 0 - NO PAIN
PAINLEVEL_OUTOF10: 3
PAINLEVEL_OUTOF10: 0 - NO PAIN
PAINLEVEL_OUTOF10: 0 - NO PAIN
PAINLEVEL_OUTOF10: 5 - MODERATE PAIN
PAINLEVEL_OUTOF10: 0 - NO PAIN

## 2025-06-27 ASSESSMENT — COLUMBIA-SUICIDE SEVERITY RATING SCALE - C-SSRS
2. HAVE YOU ACTUALLY HAD ANY THOUGHTS OF KILLING YOURSELF?: NO
6. HAVE YOU EVER DONE ANYTHING, STARTED TO DO ANYTHING, OR PREPARED TO DO ANYTHING TO END YOUR LIFE?: NO
1. IN THE PAST MONTH, HAVE YOU WISHED YOU WERE DEAD OR WISHED YOU COULD GO TO SLEEP AND NOT WAKE UP?: NO

## 2025-06-27 ASSESSMENT — PAIN - FUNCTIONAL ASSESSMENT
PAIN_FUNCTIONAL_ASSESSMENT: 0-10

## 2025-06-27 NOTE — Clinical Note
Angioplasty of the middle left anterior descending lesion. Inflation 1: Pressure = 12 jonathan; Duration = 7 sec. Inflation 2: Pressure = 12 jonathan; Duration = 6 sec.

## 2025-06-27 NOTE — Clinical Note
Closure device placed in the right femoral artery. Site closed by Angio-Seal. Deployed By: Levi Jolley MD

## 2025-06-27 NOTE — Clinical Note
Vessel: LAD (mid). Stent inserted. Inflation 1: Pressure = 9 jonathan; Duration = 15 sec. Inflation 2: Pressure = 9 jonathan; Duration = 10 sec.

## 2025-06-27 NOTE — DISCHARGE INSTRUCTIONS
DISCHARGE INSTRUCTIONS FOR CARDIAC CATHETERIZATION.   After your procedure, the physician will apply a plastic band (called a Hemostasis band) to your wrist for a prescribed period of time. Once this is removed by the nursing staff, a light pressure dressing will be applied and is left on overnight.     Instructions  -Blood pressure checks and lab draws should not be donee on the procedural arm for 24hrs  -The pressure dressing can be removed the following morning.  -Wash the puncture site gently with mild soap and water  -Apply a Band-Aid to the site for a few days to keep it clean and dry.  -Do not submerge your hand in dishwater, bathtubs, or other water sources for 3 days  -Avoid flexing the wrist(hammering, playing tennis or swinging objects) for 3 days.  -Do not lift more than 5 pounds for 72 hours.  -Ok to drive short distances after 24hrs. No restriction after 3 days.     Go to the hospital immediately or call 911 if the following occur:  -Bleeding or swelling of the site-apply manual pressure directly over the access site  -Loss of sensation in your hand or fingers  -Redness, swelling, or discharge at the procedure site.    -For minor discomfort:  -Take tylenol (acetaminophen) as prescribed by your physician  -Elevate the affected arm  -Apply an ice pact for comfort or swelling.  PCI HOME GOING DISCHARGE INSTRUCTIONS FOR GROIN CARE    DO NOT lift anything heavier than 10 lbs for 1 week to allow time for your groin to heal properly.  No strenuous activity for 1 week.  Keep stair climbing to a minimum the first day after your procedure.  No sexual activity for 24 hrs.  You may remove the bandage after 24 hrs. You may shower, gently washing the site with soap and water, only and pat dry.  DO NOT go swimming, soak in a tub pr hot tub for 1 week to help prevent infection.  Your groin should remain soft, clean, and dry. It is normal to experience tenderness and bruising at the site. Also, a pea sized lump may be  present. That should go away on its own within 1 week.  Watch for signs & symptoms of infection:  fever, redness, drainage, increase in tenderness or pain.  Also watch for bruising that spreads down your leg or a lump at the puncture site that grows larger than a pea.  If you start bleeding from your groin, lay down and have someone apply firm pressure just above the puncture site. If bleeding does not stop after 5 mins or you cannot control the bleeding, call 911.  DO NOT drive a car, operate machinery or power tools for 24 hrs.  DO NOT drink alcohol or take medication that contains alcohol for 24 hrs.  DO NOT make any important decisions or sign any legal documents for 24 hrs.

## 2025-06-27 NOTE — POST-PROCEDURE NOTE
Physician Transition of Care Summary  Invasive Cardiovascular Lab    Procedure Date: 6/27/2025  Attending:    Sun Jolley - Primary  Resident/Fellow/Other Assistant: Surgeons and Role:  * No surgeons found with a matching role *    Indications:   Pre-op Diagnosis      * Positive cardiac stress test [R94.39]    Post-procedure diagnosis:   Post-op Diagnosis     * Positive cardiac stress test [R94.39]    Procedure(s):   [facility], With LV  83477 - MA CATH PLMT L HRT & ARTS W/NJX & ANGIO IMG S&I    PCI VIRI Stent- Coronary    IVUS - Coronary  36350 - MA ENDOLUMINAL CORONARY IVUS OCT I&R INITIAL VESSEL      Procedure Findings:   LMT normal.  LAD 99% proximal, 50% mid.  Lcx mild diffuse disease.  RCA dom mild diffuse disease.  LVEDP normal.  LVEF normal.    Successful PCI ivus-guided proximal LAD with VIRI X 1    Description of the Procedure:   R radial 5/6 slender, could not advance catheter due to severe tortuosity.  R femoral 6 fr sheath.  JL4, 3DRC, pigtail.  XB3.5 guide.    Complications:   None    Stents/Implants:   Implants          Stent          Stent, GeoVax, TopFachhandel UG Mr, 3.50 X 16mm - [id number] - Implanted        Inventory item: STENT, Crossfader, Kisstixx MR, 3.50 X 16MM Model/Cat number: [id number]    : [employer] Lot number: [id number]    Device identifier: [id number]        GUDID Information       Request status Successful        Brand name: SYNERGY MEGATRON™ Version/Model: [id number]    Company name: [employer] MRI safety info as of 6/27/25: MR Conditional    Contains dry or latex rubber: No      GMDN P.T. name: Drug-eluting coronary artery stent, bioabsorbable-polymer-coated                As of 6/27/2025       Status: Implanted                              Anticoagulation/Antiplatelet Plan:   DAPT X 12 months    Estimated Blood Loss:   5 mL    Anesthesia: Moderate Sedation Anesthesia Staff: No anesthesia staff entered.    Any Specimen(s) Removed:   Order Name  Source Comment Collection Info Order Time   BASIC METABOLIC PANEL Blood, Venous  Collected By: Mai Dumont RN 6/27/2025 11:31 AM     Release result to [facility]   Immediate        CBC Blood, Venous  Collected By: Mai Dumont RN 6/27/2025 11:31 AM     Release result to [employer]   Immediate            Disposition:   Home today, followup in 2-4 weeks.      Electronically signed by: Levi Jolley MD, 6/27/2025 2:59 PM

## 2025-06-27 NOTE — Clinical Note
Closure device placed in the right radial artery. Site closed by radial compression system. Deployed By: Jessenia Gordon, RT15 ccs in band

## 2025-06-27 NOTE — Clinical Note
Angioplasty of the middle left anterior descending lesion. Inflation 1: Pressure = 12 jonathan; Duration = 15 sec. Inflation 2: Pressure = 14 jonathan; Duration = 15 sec.

## 2025-06-27 NOTE — SIGNIFICANT EVENT
The patient coming in for elective exercise stress test for which he had a significant chest pressure radiated to the left shoulder and upper arm.  Intensity is a 4 on a scale 0-10 he was on the treadmill.  Brief history of his chest discomfort intermittently for past 2 months and the patient had a 2 ER visit in [facility] as well as a ER here in [facility] and with a both discharged home on the same day after initial workup negative  The patient complained of a exertional chest pain for past few weeks and gradually worse with exertion.    ST depression downslope 2 mm  inferior and lateral and the treadmill was stopped due to symptoms of chest pain and abnormal EKG changes at heart rate 74%    Plan; spoke with Dr. Montalvo regarding positive exercise/stress test along with the symptom typical angina  - Left heart cath with possible PCI given patient of angina on exertion significant positive exercise stress test.   former smoker.  History of hypertension  CBC.  BMP and magnesium prior to cath  -  light of sx and multiple risk factors,we recommend for cardiac cath to r/o coroanry artery disease and we explained in detailed to the pt and family re; bleeding,infection,stroke,MI and death,they understand the information well and agree with the plan .  I acknowledge that this center does not have an on-site open heart surgery program and that if necessary as a result of an adverse event or clinical finding requiring cardiac surgery or advanced medical care I may need to be transferred to another [facility] facility that has a cardiac surgery program in a timely manner according to the [place] Department of Health's guidelines.

## 2025-06-28 LAB
ATRIAL RATE: 61 BPM
P AXIS: 46 DEGREES
P OFFSET: 200 MS
P ONSET: 137 MS
PR INTERVAL: 172 MS
Q ONSET: 223 MS
QRS COUNT: 10 BEATS
QRS DURATION: 88 MS
QT INTERVAL: 414 MS
QTC CALCULATION(BAZETT): 416 MS
QTC FREDERICIA: 416 MS
R AXIS: 37 DEGREES
T AXIS: 46 DEGREES
T OFFSET: 430 MS
VENTRICULAR RATE: 61 BPM

## 2025-06-30 ENCOUNTER — TRANSCRIBE ORDERS (OUTPATIENT)
Dept: CARDIAC REHAB | Facility: CLINIC | Age: 55
End: 2025-06-30
Payer: COMMERCIAL

## 2025-06-30 DIAGNOSIS — Z95.5 S/P CORONARY ARTERY STENT PLACEMENT: Primary | ICD-10-CM

## 2025-06-30 NOTE — NURSING NOTE
Cardiac Rehab: Patient discharged prior to being seen for qualifying diagnosis to cardiac rehab program. [facility] Handbook and pamphlet given by cath lab RN. Cardiac rehab staff will contact patient following discharge for enrollment in program.

## 2025-07-01 DIAGNOSIS — I25.10 CAD S/P PERCUTANEOUS CORONARY ANGIOPLASTY: Primary | ICD-10-CM

## 2025-07-01 DIAGNOSIS — Z98.61 CAD S/P PERCUTANEOUS CORONARY ANGIOPLASTY: Primary | ICD-10-CM

## 2025-07-01 RX ORDER — PRASUGREL 10 MG/1
10 TABLET, FILM COATED ORAL DAILY
Qty: 90 TABLET | Refills: 0 | Status: SHIPPED | OUTPATIENT
Start: 2025-07-01 | End: 2025-09-29

## 2025-07-08 ENCOUNTER — CLINICAL SUPPORT (OUTPATIENT)
Dept: CARDIAC REHAB | Facility: CLINIC | Age: 55
End: 2025-07-08
Payer: COMMERCIAL

## 2025-07-08 DIAGNOSIS — Z95.5 S/P CORONARY ARTERY STENT PLACEMENT: Primary | ICD-10-CM

## 2025-07-08 PROCEDURE — 93798 PHYS/QHP OP CAR RHAB W/ECG: CPT

## 2025-07-08 NOTE — PROGRESS NOTES
INDIVIDUAL CARDIAC TREATMENT PLAN-INITIAL ASSESSMENT     Name: Gabe Rdz   Today's Date: 25   : 1970    Primary Provider: AARON Mccann  MRN: [id number]    Referring Physician: STEFANIA Cartagena     Diagnosis: Stent x 1 LAD    Onset Date: 25      Risk Stratification: Moderate      NUTRITION ASSESSMENT  Lipids:   Lipid Lab Date: 25  Total Chol: 165  HDL: 42  LDL: 109  Tri  Cholesterol Med: Atorvastatin 80mg daily    Diabetes: No  HgbA1c: 5.5%  Date Checked: 10/13/25  Monitors glucose at home: No  Hypoglycemic Episode: Denies    Weight Management  Weight: 236 lbs  Height: 68 inches  BMI:  35.9  Pre Waist Circumference: 45.5 inches  Post Waist Circumference:  Current Diet: Low Sodium  Barriers to dietary change: Denies    Initial Dietary Assessment Score: Pending results from dietician   Discharge Dietary Assessment Score:       NUTRITION PLAN  Nutrition Goals:   1. Improve Picture Your Plate assessment results by discharge.  2. Make changes to diet to include heart healthy options while in the program.    Nutrition Intervention/Education:   *Sent dietician Picture Your Plate assessment for scoring and recommendations.   *Perform weekly weight checks on .   *BMI/ waist circumference completed by Exercise Physiologist 25 with long term goal weight: 215 lbs and short term goal to lose 1 inch off waist by discharge.      OTHER CORE COMPONENTS/ RISK FACTORS ASSESSMENT  Medication compliance: good compliance  Using pill box: Yes  Carries medication list: Yes, in phone    Blood Pressure Management:  History of High BP: Yes  Resting BP: 122/74    Tobacco: FORMER  Form of tobacco: Cigarettes 1 pack/week x 20 yrs; very few cigars  Quit Date:  04  Anyone in the house smoke: No    Initial Knowledge Test Score: 12/15  Discharge Knowledge Test Score:    OTHER CORE COMPONENTS/ RISK FACTOR PLAN   Other Core components/Risk Factor Goals:                                                                  "                                                                                   1. Achieve and maintain a resting blood pressure less than 130/80 while in the program.  2. Gain knowledge of cardiac disease and lifestyle modifications related to exercise and ADL's prior to discharge.    Other Components/ Risk Factors Intervention/Education:  *Will continue to monitor HR, BP, dyspnea and arrhythmias each session.   *Will meet with patient to discuss goals & progress.  *Encouraged review of education materials.       PSYCHOSOCIAL ASSESSMENT  Patient reported stress level: mild; work  Using stress management skills: Yes; meditation and deep breathing  HX of anxiety: No  HX of depression: No  Patient reported any new stress, depression and anxiety symptoms: No    Family/Support System: Wife, friends, co-workers  Seeing mental health provider: Pending; will start seeing counselor in August  Psychosocial medications: Denies    Initial PHQ-9 score: 2  [place] PHQ-9 score:  Discharge PHQ-9 score:   Was PHQ-9 sent to provider: No  (Score: None 0-4, Mild 5-9, Moderate 10-14, Mod-Severe 15-19, Severe 20-27)    Quality of Life Survey: SF-36 Pre Post   Physical Component Score 39.45 TBD   Mental Component Score 41.57 TBD     Stages of change:  Preparation    PSYCHOSOCIAL PLAN  Psychosocial Goals:  1. Improve stage of change from \"Preparation\" to \"Action\" while in the program.  2. Increase PCS and MCS scores from SF 36 at discharge.     Psychosocial Interventions/ Education:  * Provided one on one emotional support and will facilitate peer support within the context of other phase II patients while in the program.       EXERCISE ASSESSMENT  Home Exercise: No  Frequency:   Mode:  *was going to gym 3-4 x weekly for 30 minutes cardio on bike; no exercise since stent    EXERCISE PLAN  Exercise Goals:   1. Goal of 7.3 METs by discharge.  Pt did 4.3 METs on 12 minute walk test.  2. Have a plan in place for continued exercise after the " program by discharge.    Exercise Prescription:   Based on 12 Minute Walk Test on 7/8/25  Frequency: 3 days per week  Duration (total aerobic min.): 30 minutes  Intensity RPE: 11-14  Target HR: Rest + 30; (age-predicted: 112-146)  MET Level Range: 1.9-4.3     Modality METS Load  Duration   1 Warm Up    05:00   2 Upright Bike 3.7 60 bui Level 3 06:00   3 Arms Airdyne 1.9 0.7 load  06:00   4 Treadmill 4.3 2.8 mph 3% 06:00   5 NuStep 2.5 60 bui Level 6 06:00   6 Weights  5-100 lbs  06:00   7 Cool Down     05:00     Exercise Intervention/Education:   *Aim to progress 1 MET every 4 Weeks or as tolerated.  *Incorporate resistance training for muscular endurance and strength.      Date of first exercise session: 7/8/25      LEARNING ASSESSMENT & BARRIERS  Readiness to Learn: Eager to learn  Barriers: reading glasses  Comments:    FALL RISK (based on assessment at orientation)  low  Comments:    INDIVIDUAL PATIENT GOALS:   To learn exercise guidelines for best heart health while in the program.  2.    Learn heart healthy diet tips while in the program.    MEDICATIONS  AMLODIPINE BESYLATE 10MG DAILY, ASPIRIN 81MG DAILY, ATORVASTATIN CALCIUM 80MG DAILY, METOPROLOL SUCCINATE 50MG DAILY, MULTIVITAMIN 1 TAB DAILY, PRASUGEL 10MG DAILY     STAFF COMMENTS:   PATIENT ORIENTED INTO THE 4PM CARDIAC REHAB CLASS. PLANS TO START GROUP SESSIONS ON FRIDAY 7/11 DX: STENT. ARRIVED IN APPROPRIATE CLOTHS AND SHOES FOR EXERCISE. LOW FALL RISK BASED ON BALANCE TESTING. BODY COMPOSITION COMPLETED. STABLE BLOOD PRESSURE RESPONSE TO EXERCISE. CARDIAC MONITOR SHOWS SR-ST. DENIES ANY PAIN OR DISCOMFORT. DENIES ANY SHORTNESS OF BREATH. HAD SOME VISIBLE SWEAT ON FORHEAD AFTER WALK TEST. PATIENT REPORTS MILD WORK STRESS AND USES MEDITATION AND DEEP BREATHING FOR STRESS RELIEF.

## 2025-07-08 NOTE — PROGRESS NOTES
CARDIAC ASSESSMENT     Name: Gabe Rdz   : 1970   Diagnosis: Stent x 1 LAD  MRN: [id number]   Onset Date: 25   Today's Date: 25      Cardiovascular   HX: HTN, HLD  Family HX of CAD:  Yes, MGF  age 48 of MI, Mother MI in 60's, Father MI in 60's and stroke 70's  Angina: yes  Describe: mild chest pressure for 1 week prior to going to [facility], 2 episodes while exercising; metoprolol doubled, had GXT and elevated troponins  Last Episode: 25  History of Heart Failure: No  EF: normal  Onset of HF: N/A  Last HF hospitalization: N/A  Family HX of HF:  No  HF symptoms: fatigue with exertion; returned to work 25 and had to take a long nap.    Devices: Denies  HX of PAD: No    Arrythmias: NSR  Apical: regular  Heart Rate: 75  BP: 122/74  Radial pulses:  R Present 2+ L Present 2+    Comments:      Respiratory  HX: 2 mild cases of Covid 19, former smoker 1 pack/week for 20 yrs; quit 04  Dyspnea:  Yes  Describe: had significant SOB on brilinta; on effient now  HX JOSIE:  Yes  CPAP Use:  No; has tired it in past and unsuccessful; using mouthguard  Family History of Lung Disease:  No    Resting O2 sat: 96%  Lung Sounds: Clear  Locations: all lobes    Flu Vaccine: No  Covid Vaccine: Yes  Pneumonia Vaccine: No  Shingles Vaccine: No    Comments:    Neurological   Orientation: oriented to person, place, time, and general circumstances  HX: Denies  History of stroke/TIA?: Denies    Comments:      Skin  Skin Color: pink, warm, dry  Edema: None     Comments: few precancerous spots removed from back    Gastrointestinal/Genitourinary  HX: Denies  Comments:      Psychosocial  Marital status:   Children: 2  Lives alone:  No  Lives with: wife, Yola and 2 children  Drives:  Yes  Occupation:  full time police administration   Caretaker of family member?:  No  Do you feel safe at home?:  Yes    Caffeinated drinks per day: 0  Alcoholic drinks per day/week: 0  HX drug or alcohol abuse: No  Current use of  illicit drugs: No  Marijuana use: No    Comments:    Musculoskeletal  HX of injury/surgery: Denies    Comments:    Pain Assessment  Current pain: chronic  Location: LBP  Description: sees chiropractor once monthy    Comments: 2-3/10 usually    Fall Risk Assessment  Assistive device: no device  Needs assistance:  No  Afraid of falling:  No  Fall within the past 6 months?: No  Injured with fall:  Fall risk results: low

## 2025-07-08 NOTE — PROGRESS NOTES
INDIVIDUAL CARDIAC TREATMENT PLAN-INITIAL ASSESSMENT     Name: Gabe Rdz   Today's Date: 25   : 1970    Primary Provider: AARON Mccann  MRN: [id number]    Referring Physician: STEFANIA Cartagena     Diagnosis: Stent x 1 LAD    Onset Date: 25      Risk Stratification: Moderate      NUTRITION ASSESSMENT  Lipids:   Lipid Lab Date: 25  Total Chol: 165  HDL: 42  LDL: 109  Tri  Cholesterol Med: Atorvastatin 80mg daily    Diabetes: No  HgbA1c: 5.5%  Date Checked: 10/13/25  Monitors glucose at home: No  Hypoglycemic Episode: Denies    Weight Management  Weight: 236 lbs  Height: 68 inches  BMI:  35.9  Pre Waist Circumference: 45.5 inches  Post Waist Circumference:  Current Diet: Low Sodium  Barriers to dietary change: Denies    Initial Dietary Assessment Score: Pending results from dietician   Discharge Dietary Assessment Score:       NUTRITION PLAN  Nutrition Goals:   1. Improve Picture Your Plate assessment results by discharge.  2. Make changes to diet to include heart healthy options while in the program.    Nutrition Intervention/Education:   *Sent dietician Picture Your Plate assessment for scoring and recommendations.   *Perform weekly weight checks on .   *BMI/ waist circumference completed by Exercise Physiologist 25 with long term goal weight: 215 lbs and short term goal to lose 1 inch off waist by discharge.      OTHER CORE COMPONENTS/ RISK FACTORS ASSESSMENT  Medication compliance: good compliance  Using pill box: Yes  Carries medication list: Yes, in phone    Blood Pressure Management:  History of High BP: Yes  Resting BP: 122/74    Tobacco: FORMER  Form of tobacco: Cigarettes 1 pack/week x 20 yrs; very few cigars  Quit Date:  04  Anyone in the house smoke: No    Initial Knowledge Test Score: 12/15  Discharge Knowledge Test Score:    OTHER CORE COMPONENTS/ RISK FACTOR PLAN   Other Core components/Risk Factor Goals:                                                                  "                                                                                   1. Achieve and maintain a resting blood pressure less than 130/80 while in the program.  2. Gain knowledge of cardiac disease and lifestyle modifications related to exercise and ADL's prior to discharge.    Other Components/ Risk Factors Intervention/Education:  *Will continue to monitor HR, BP, dyspnea and arrhythmias each session.   *Will meet with patient to discuss goals & progress.  *Encouraged review of education materials.       PSYCHOSOCIAL ASSESSMENT  Patient reported stress level: mild; work  Using stress management skills: Yes; meditation and deep breathing  HX of anxiety: No  HX of depression: No  Patient reported any new stress, depression and anxiety symptoms: No    Family/Support System: Wife, friends, co-workers  Seeing mental health provider: Pending; will start seeing counselor in August  Psychosocial medications: Denies    Initial PHQ-9 score: 2  [place] PHQ-9 score:  Discharge PHQ-9 score:   Was PHQ-9 sent to provider: No  (Score: None 0-4, Mild 5-9, Moderate 10-14, Mod-Severe 15-19, Severe 20-27)    Quality of Life Survey: SF-36 Pre Post   Physical Component Score 39.45 TBD   Mental Component Score 41.57 TBD     Stages of change:  Preparation    PSYCHOSOCIAL PLAN  Psychosocial Goals:  1. Improve stage of change from \"Preparation\" to \"Action\" while in the program.  2. Increase PCS and MCS scores from SF 36 at discharge.     Psychosocial Interventions/ Education:  * Provided one on one emotional support and will facilitate peer support within the context of other phase II patients while in the program.       EXERCISE ASSESSMENT  Home Exercise: No  Frequency:   Mode:  *was going to gym 3-4 x weekly for 30 minutes cardio on bike; no exercise since stent    EXERCISE PLAN  Exercise Goals:   1. Goal of 7.3 METs by discharge.  Pt did 4.3 METs on 12 minute walk test.  2. Have a plan in place for continued exercise after the " program by discharge.    Exercise Prescription:   Based on 12 Minute Walk Test on 7/8/25  Frequency: 3 days per week  Duration (total aerobic min.): 30 minutes  Intensity RPE: 11-14  Target HR: Rest + 30; (age-predicted: 112-146)  MET Level Range: 1.9-4.3     Modality METS Load  Duration   1 Warm Up    05:00   2 Upright Bike 3.7 60 bui Level 3 06:00   3 Arms Airdyne 1.9 0.7 load  06:00   4 Treadmill 4.3 2.8 mph 3% 06:00   5 NuStep 2.5 60 bui Level 6 06:00   6 Weights  5-100 lbs  06:00   7 Cool Down     05:00     Exercise Intervention/Education:   *Aim to progress 1 MET every 4 Weeks or as tolerated.  *Incorporate resistance training for muscular endurance and strength.      Date of first exercise session: 7/8/25      LEARNING ASSESSMENT & BARRIERS  Readiness to Learn: Eager to learn  Barriers: reading glasses  Comments:    FALL RISK (based on assessment at orientation)  low  Comments:    INDIVIDUAL PATIENT GOALS:   To learn exercise guidelines for best heart health while in the program.  2.    Learn heart healthy diet tips while in the program.    MEDICATIONS  AMLODIPINE BESYLATE 10MG DAILY, ASPIRIN 81MG DAILY, ATORVASTATIN CALCIUM 80MG DAILY, METOPROLOL SUCCINATE 50MG DAILY, MULTIVITAMIN 1 TAB DAILY, PRASUGEL 10MG DAILY     STAFF COMMENTS:   PATIENT ORIENTED INTO THE 4PM CARDIAC REHAB CLASS. PLANS TO START GROUP SESSIONS ON FRIDAY 7/11 DX: STENT. ARRIVED IN APPROPRIATE CLOTHS AND SHOES FOR EXERCISE. LOW FALL RISK BASED ON BALANCE TESTING. BODY COMPOSITION COMPLETED. STABLE BLOOD PRESSURE RESPONSE TO EXERCISE. CARDIAC MONITOR SHOWS SR-ST. DENIES ANY PAIN OR DISCOMFORT. DENIES ANY SHORTNESS OF BREATH. HAD SOME VISIBLE SWEAT ON FORHEAD AFTER WALK TEST. PATIENT REPORTS MILD WORK STRESS AND USES MEDITATION AND DEEP BREATHING FOR STRESS RELIEF.

## 2025-07-11 ENCOUNTER — CLINICAL SUPPORT (OUTPATIENT)
Dept: CARDIAC REHAB | Facility: CLINIC | Age: 55
End: 2025-07-11
Payer: COMMERCIAL

## 2025-07-11 DIAGNOSIS — Z95.5 S/P CORONARY ARTERY STENT PLACEMENT: ICD-10-CM

## 2025-07-11 PROCEDURE — 93798 PHYS/QHP OP CAR RHAB W/ECG: CPT | Performed by: INTERNAL MEDICINE

## 2025-07-11 NOTE — PROGRESS NOTES
INITIAL PICTURE YOUR PLATE ASSESSMENT  CARDIAC REHAB    SCORES:  Vegetables & Fruit (out of 12)                 6   Breads, Grains & Cereals (out of 12)        4  Red & Processed Meat (out of 12)         3  Poultry (out of 2)                                   0  Fish & Shellfish (out of 4)                      0  Beans, Nuts & Seeds (out of 4)             2  Milk & Dairy Foods (out of 6)              5  Toppings, Oils, Seasonings & Salt (out of 20)    14  Sweets, Snacks & Restaurant Food (out of 14)    9  Beverages (out of 10)          8     Overall Score (out of 96)    51    DIAGNOSIS  Inadequate intake of fruts and vegetables.  Inadequate intake of whole grain with greater intake of refined/processed grains than whole grains.       GOALS  Aim to consume at least 5 fruits and vegetables/d.   Fruit and Vegetable Tip Sheet provided.    Aim increase intake of whole grains by replacing refined/white grains with more whole grain products. Bread, Cereal, Grain tip sheet provided.       Patient provided dietitian phone number for any further diet related questions.

## 2025-07-11 NOTE — H&P
"History Of Present Illness  Gabe Rdz is a 54 y.o. male presenting with need for heart cath, abnormal stress test.     Past Medical History  Medical History[1]    Surgical History  Surgical History[2]     Social History  He reports that he quit smoking about 21 years ago. His smoking use included cigarettes. He started smoking about 37 years ago. He has a 8.1 pack-year smoking history. He has never used smokeless tobacco. He reports current alcohol use of about 2.0 standard drinks of alcohol per week. He reports that he does not use drugs.    Family History  Family History[3]     Allergies  Penicillins    Review of Systems     Physical Exam     Last Recorded Vitals  Blood pressure 148/87, pulse 70, resp. rate 16, height 1.727 m (5' 8\"), weight 108 kg (239 lb), SpO2 97%.    Relevant Results        Cath poss PCI     Assessment & Plan  Positive cardiac stress test          I spent 15 minutes in the professional and overall care of this patient.      Levi Jolley MD         [1]   Past Medical History:  Diagnosis Date    HLD (hyperlipidemia)     Hypertension 2017    Kidney calculi     JOSIE (obstructive sleep apnea)     Other specified health status 03/14/2018    No pertinent past medical history   [2]   Past Surgical History:  Procedure Laterality Date    CARDIAC CATHETERIZATION N/A 6/27/2025    Procedure: LHC, With LV;  Surgeon: Levi Jolley MD;  Location: [facility] Cardiac Cath Lab;  Service: Cardiovascular;  Laterality: N/A;    CARDIAC CATHETERIZATION N/A 6/27/2025    Procedure: PCI VIRI Stent- Coronary;  Surgeon: Levi Jolley MD;  Location: [facility] Cardiac Cath Lab;  Service: Cardiovascular;  Laterality: N/A;    CARDIAC CATHETERIZATION N/A 6/27/2025    Procedure: IVUS - Coronary;  Surgeon: Levi Jolley MD;  Location: [facility] Cardiac Cath Lab;  Service: Cardiovascular;  Laterality: N/A;    CORONARY STENT PLACEMENT  6/27/2025   [3]   Family History  Problem Relation Name Age of Onset    Cancer " Father Zi Rdz     Stroke Father Zialvin Rdz     Heart attack Father Zialvin Rdz     Heart attack Maternal Grandfather Dominick Olvera     Angina Maternal Grandfather Dominick Olvera 40 - 49    Thyroid disease Mother Chelsea Rdz

## 2025-07-14 ENCOUNTER — CLINICAL SUPPORT (OUTPATIENT)
Dept: CARDIAC REHAB | Facility: CLINIC | Age: 55
End: 2025-07-14
Payer: COMMERCIAL

## 2025-07-14 DIAGNOSIS — Z95.5 S/P CORONARY ARTERY STENT PLACEMENT: ICD-10-CM

## 2025-07-14 PROCEDURE — 93798 PHYS/QHP OP CAR RHAB W/ECG: CPT | Performed by: INTERNAL MEDICINE

## 2025-07-16 ENCOUNTER — CLINICAL SUPPORT (OUTPATIENT)
Dept: CARDIAC REHAB | Facility: CLINIC | Age: 55
End: 2025-07-16
Payer: COMMERCIAL

## 2025-07-16 DIAGNOSIS — Z95.5 S/P CORONARY ARTERY STENT PLACEMENT: ICD-10-CM

## 2025-07-16 PROCEDURE — 93798 PHYS/QHP OP CAR RHAB W/ECG: CPT | Performed by: INTERNAL MEDICINE

## 2025-07-18 ENCOUNTER — CLINICAL SUPPORT (OUTPATIENT)
Dept: CARDIAC REHAB | Facility: CLINIC | Age: 55
End: 2025-07-18
Payer: COMMERCIAL

## 2025-07-18 DIAGNOSIS — Z95.5 S/P CORONARY ARTERY STENT PLACEMENT: ICD-10-CM

## 2025-07-18 PROCEDURE — 93798 PHYS/QHP OP CAR RHAB W/ECG: CPT | Performed by: INTERNAL MEDICINE

## 2025-07-21 ENCOUNTER — CLINICAL SUPPORT (OUTPATIENT)
Dept: CARDIAC REHAB | Facility: CLINIC | Age: 55
End: 2025-07-21
Payer: COMMERCIAL

## 2025-07-21 DIAGNOSIS — Z95.5 S/P CORONARY ARTERY STENT PLACEMENT: ICD-10-CM

## 2025-07-21 PROCEDURE — 93798 PHYS/QHP OP CAR RHAB W/ECG: CPT | Performed by: INTERNAL MEDICINE

## 2025-07-23 ENCOUNTER — CLINICAL SUPPORT (OUTPATIENT)
Dept: CARDIAC REHAB | Facility: CLINIC | Age: 55
End: 2025-07-23
Payer: COMMERCIAL

## 2025-07-23 DIAGNOSIS — Z95.5 S/P CORONARY ARTERY STENT PLACEMENT: ICD-10-CM

## 2025-07-23 PROCEDURE — 93798 PHYS/QHP OP CAR RHAB W/ECG: CPT | Performed by: INTERNAL MEDICINE

## 2025-07-25 ENCOUNTER — APPOINTMENT (OUTPATIENT)
Dept: CARDIOLOGY | Facility: CLINIC | Age: 55
End: 2025-07-25
Payer: COMMERCIAL

## 2025-07-25 ENCOUNTER — CLINICAL SUPPORT (OUTPATIENT)
Dept: CARDIAC REHAB | Facility: CLINIC | Age: 55
End: 2025-07-25
Payer: COMMERCIAL

## 2025-07-25 VITALS
HEART RATE: 61 BPM | OXYGEN SATURATION: 98 % | WEIGHT: 233.6 LBS | HEIGHT: 68 IN | DIASTOLIC BLOOD PRESSURE: 70 MMHG | RESPIRATION RATE: 18 BRPM | SYSTOLIC BLOOD PRESSURE: 126 MMHG | BODY MASS INDEX: 35.4 KG/M2

## 2025-07-25 DIAGNOSIS — R94.39 POSITIVE CARDIAC STRESS TEST: ICD-10-CM

## 2025-07-25 DIAGNOSIS — I10 ESSENTIAL (PRIMARY) HYPERTENSION: ICD-10-CM

## 2025-07-25 DIAGNOSIS — Z98.61 CAD S/P PERCUTANEOUS CORONARY ANGIOPLASTY: Primary | ICD-10-CM

## 2025-07-25 DIAGNOSIS — E78.2 MIXED HYPERLIPIDEMIA: ICD-10-CM

## 2025-07-25 DIAGNOSIS — Z95.5 S/P CORONARY ARTERY STENT PLACEMENT: ICD-10-CM

## 2025-07-25 DIAGNOSIS — I25.10 CAD S/P PERCUTANEOUS CORONARY ANGIOPLASTY: Primary | ICD-10-CM

## 2025-07-25 PROBLEM — R07.89 CHEST DISCOMFORT: Status: RESOLVED | Noted: 2025-06-13 | Resolved: 2025-07-25

## 2025-07-25 PROBLEM — R79.89 ELEVATED TROPONIN: Status: RESOLVED | Noted: 2025-06-13 | Resolved: 2025-07-25

## 2025-07-25 PROBLEM — R07.9 EXERTIONAL CHEST PAIN: Status: RESOLVED | Noted: 2025-06-13 | Resolved: 2025-07-25

## 2025-07-25 PROCEDURE — 93798 PHYS/QHP OP CAR RHAB W/ECG: CPT | Performed by: INTERNAL MEDICINE

## 2025-07-25 PROCEDURE — 3074F SYST BP LT 130 MM HG: CPT | Performed by: NURSE PRACTITIONER

## 2025-07-25 PROCEDURE — 3008F BODY MASS INDEX DOCD: CPT | Performed by: NURSE PRACTITIONER

## 2025-07-25 PROCEDURE — 3078F DIAST BP <80 MM HG: CPT | Performed by: NURSE PRACTITIONER

## 2025-07-25 PROCEDURE — 99214 OFFICE O/P EST MOD 30 MIN: CPT | Performed by: NURSE PRACTITIONER

## 2025-07-25 RX ORDER — METOPROLOL SUCCINATE 50 MG/1
50 TABLET, EXTENDED RELEASE ORAL DAILY
Qty: 90 TABLET | Refills: 3 | Status: SHIPPED | OUTPATIENT
Start: 2025-07-25 | End: 2026-07-25

## 2025-07-25 RX ORDER — AMLODIPINE BESYLATE 10 MG/1
10 TABLET ORAL DAILY
Qty: 90 TABLET | Refills: 3 | Status: SHIPPED | OUTPATIENT
Start: 2025-07-25 | End: 2026-07-25

## 2025-07-25 RX ORDER — ATORVASTATIN CALCIUM 80 MG/1
80 TABLET, FILM COATED ORAL
Qty: 30 TABLET | Refills: 3 | Status: SHIPPED | OUTPATIENT
Start: 2025-07-25 | End: 2026-07-25

## 2025-07-25 RX ORDER — ASPIRIN 81 MG/1
81 TABLET ORAL DAILY
Qty: 90 TABLET | Refills: 3 | Status: SHIPPED | OUTPATIENT
Start: 2025-07-25 | End: 2026-07-25

## 2025-07-25 RX ORDER — PRASUGREL 10 MG/1
10 TABLET, FILM COATED ORAL DAILY
Qty: 90 TABLET | Refills: 1 | Status: SHIPPED | OUTPATIENT
Start: 2025-07-25 | End: 2026-01-21

## 2025-07-25 NOTE — PROGRESS NOTES
Name : Gabe Rdz   : 1970   MRN : [id number]   ENC Date : 2025    CC:   Post-Cath, Coronary Artery Disease, Hyperlipidemia, and Hypertension     HPI:    Gabe Rdz is a 54 y.o. male with PMHx HTN, HLD & JOSIE intolerant of CPAP/using oral appliance who presents today on follow up [facility] s/p PCI with stent placement in the pLAD.    25  3rd week of cardiac rehab  A little less winded after workouts & a little less fatigued  Interval Hx:  [facility] 25. He complained of chest pain at PCP office. PCP ordered troponin which came back at 14 & he was advised to go to ED. EKG done, they couldn't get a vein to repeat troponin, plan was for admission, he waited for 5 hrs with no bed so he went home.    Then went to [facility] 25 2/2 chest pain & HTN. Repeat troponin 5 & 5. Workup unremarkable.    Prior to & after ED visits he was going to the gym 4-5 days a week  30-35mins of cardio on a bike with weight lifting once a week.    He established with me on 25 for CV evaluation of chest pain. I sent him for an exercise stress test which was positive & he was immediately taken to the cath lab by Dr. Jolley.    He underwent coronary angiography (25) & was found to have 99% stenosis pLAD as well as 50% stenosis of the mRCA. He had successful ivus-guided PCI of proximal LAD with placement of 1 Synergy Megatron 3.50x16 stent. Post-dilation with<10% residual stenosis.     Medical management of the 50% stenosis of the mRCA.     Since PCI, Gabe has been doing well. He is in his 3rd week of cardiac rehab.  I did ask if he had noted any improvement in symptoms that he may have attributed to other factors that he did not think was heart related.  He tells me that he notices he is a little less breathless after exercise & not as fatigued.    Former smoker, quit 7-8 yrs doing a pack a week at least & 1/2 PPC at worst  x 20 yrs    A1C 5.5   on max atorvastatin    Family history: Maternal grandfather  "with MI in 40s. Mother with MI in her late 60s or early 70s, father with MI in his 70s     CV Diagnostics:  [facility] 6/27/25:   1. Left Main Coronary Artery: This artery is normal.   2. Proximal LAD : 99% stenosis.   3. Left Anterior Descending Artery: is significantly obstructed.   4. Proximal LAD Lesion: The percent stenosis is 99%.   5. Proximal LAD Lesion: pre-dilation, Synergy Megatron 3.50x16 post-dilation : <10% residual stenosis. LAD: pre-procedure MARQUIS flow was 3(complete perfusion), post-procedure MARQUIS flow was 3(complete perfusion) and the lesion risk category is High/C.   6. Circumflex Coronary Artery: mildly diseased and diffusely dieased.   7. Right Coronary Artery: non-significant obstruction and is diffusely diseased.   8. Mid RCA Lesion: The percent stenosis is 50%.   9. The Left Ventricular Ejection Fraction is 60%.  10. Left Ventricular End Diastolic Pressure: 8 mm Hg.  11. Aortic Stenosis: None.  12. Left Ventricular End Diastolic Pressure Dysfunction: None.  13. LV: No RWMA.  14. LV: normal left ventricular systolic function.  15. Mitral Regurgitation: None.    ROS: unless otherwise noted in the history of present illness, all other systems were reviewed and they are negative for complaints      Allergies:  Penicillins    Outpatient Medications:  Current Outpatient Medications   Medication Instructions    amLODIPine (NORVASC) 10 mg, Daily    aspirin 81 mg, Daily    atorvastatin (LIPITOR) 80 mg, Daily RT    metoprolol succinate XL (TOPROL-XL) 50 mg, oral, Daily, Do not crush or chew.    multivitamin tablet 1 tablet, Daily RT    prasugrel (EFFIENT) 10 mg, oral, Daily     Last Recorded Vitals:  Vitals:    07/25/25 0828   BP: 126/70   BP Location: Left arm   Patient Position: Sitting   Pulse: 61   Resp: 18   SpO2: 98%   Weight: 106 kg (233 lb 9.6 oz)   Height: 1.727 m (5' 8\")     Physical Exam:  On exam Mr. Gabe Rdz appears his stated age, is alert and oriented x3, and in no acute distress. His sclera " "are anicteric and his oropharynx has moist mucous membranes. His neck is supple and without thyromegaly. The JVP is ~5 cm of water above the right atrium. His cardiac exam has regular rhythm, normal S1, S2. No S3/4. There are no murmurs. His lungs are clear to auscultation bilaterally and there is no dullness to percussion. His abdomen is soft, nontender with normoactive bowel sounds. There is no HJR. The extremities are warm and without edema. The skin is dry. There is no rash present. The distal pulses are 2-3+ in all four extremities. His mood and affect are appropriate for todays encounter.     Right groin cath site: Healed. No hematoma. 2+ palpable femoral pulse    Right radial site: Healed. No hematoma. 2+ palpable radial pulse     Last Labs:  CBC -  Lab Results   Component Value Date    WBC 9.6 06/27/2025    HGB 14.7 06/27/2025    HCT 43.3 06/27/2025    MCV 85 06/27/2025     06/27/2025       CMP -  Lab Results   Component Value Date    CALCIUM 9.6 06/27/2025    PROT 7.7 05/17/2025    ALBUMIN 4.5 05/17/2025    AST 20 05/17/2025    ALT 37 05/17/2025    ALKPHOS 98 05/17/2025    BILITOT 0.9 05/17/2025       LIPID PANEL -   No results found for: \"CHOL\", \"TRIG\", \"HDL\", \"CHHDL\", \"LDLF\", \"VLDL\", \"NHDL\"    RENAL FUNCTION PANEL -   Lab Results   Component Value Date    GLUCOSE 96 06/27/2025     06/27/2025    K 3.9 06/27/2025     06/27/2025    CO2 26 06/27/2025    ANIONGAP 14 06/27/2025    BUN 19 06/27/2025    CREATININE 1.00 06/27/2025    CALCIUM 9.6 06/27/2025    ALBUMIN 4.5 05/17/2025        Lab Results   Component Value Date    HGBA1C 5.5 10/13/2023     I have personally reviewed the above lab results: CBC, chemistry, other labs as you see listed & diagnostics, I have specifically listed the results of these tests above.    Assessment/Plan:  CAD s/p adhoc PCI pLAD with 1 VIRI (6/27/25). Known 50% stenosis of the mRCA which will be medically managed.  - c/w ASA 81mg every day life long  - c/w " Atorvastatin 80mg every day  - c/w Effient for 6 months. Stop date: 12/27/25  - c/w BB  - echocardiogram to assess function    Hyperlipidemia. Repeat lipid panel as last one was on Atorvastatin 40mg every day & now he is on 80mg. Goal LDL < 70    Hypertension. BP /70 mmHg today. Nicely controlled at this time. C/w: amlodipine 10mg every day, metoprolol XL 50mg every day     Will review lipid panel once resulted to see if any further adjustments need to be made. Will plan for virtual follow up after echocardiogram & if all is well, in person follow up in 6 months    Tracy M Schwab, SHELLY-CNP

## 2025-07-28 ENCOUNTER — CLINICAL SUPPORT (OUTPATIENT)
Dept: CARDIAC REHAB | Facility: CLINIC | Age: 55
End: 2025-07-28
Payer: COMMERCIAL

## 2025-07-28 DIAGNOSIS — Z95.5 S/P CORONARY ARTERY STENT PLACEMENT: ICD-10-CM

## 2025-07-28 PROCEDURE — 93798 PHYS/QHP OP CAR RHAB W/ECG: CPT | Performed by: INTERNAL MEDICINE

## 2025-07-30 ENCOUNTER — CLINICAL SUPPORT (OUTPATIENT)
Dept: CARDIAC REHAB | Facility: CLINIC | Age: 55
End: 2025-07-30
Payer: COMMERCIAL

## 2025-07-30 DIAGNOSIS — Z95.5 S/P CORONARY ARTERY STENT PLACEMENT: ICD-10-CM

## 2025-07-30 PROCEDURE — 93798 PHYS/QHP OP CAR RHAB W/ECG: CPT | Performed by: INTERNAL MEDICINE

## 2025-07-31 ENCOUNTER — HOSPITAL ENCOUNTER (OUTPATIENT)
Dept: CARDIOLOGY | Facility: HOSPITAL | Age: 55
Discharge: HOME | End: 2025-07-31
Payer: COMMERCIAL

## 2025-07-31 DIAGNOSIS — I25.10 CAD S/P PERCUTANEOUS CORONARY ANGIOPLASTY: ICD-10-CM

## 2025-07-31 DIAGNOSIS — Z98.61 CAD S/P PERCUTANEOUS CORONARY ANGIOPLASTY: ICD-10-CM

## 2025-07-31 PROCEDURE — 93306 TTE W/DOPPLER COMPLETE: CPT | Performed by: INTERNAL MEDICINE

## 2025-07-31 PROCEDURE — 2500000004 HC RX 250 GENERAL PHARMACY W/ HCPCS (ALT 636 FOR OP/ED): Performed by: NURSE PRACTITIONER

## 2025-07-31 PROCEDURE — C8929 TTE W OR WO FOL WCON,DOPPLER: HCPCS

## 2025-07-31 RX ADMIN — PERFLUTREN 2 ML OF DILUTION: 6.52 INJECTION, SUSPENSION INTRAVENOUS at 15:11

## 2025-08-01 ENCOUNTER — CLINICAL SUPPORT (OUTPATIENT)
Dept: CARDIAC REHAB | Facility: CLINIC | Age: 55
End: 2025-08-01
Payer: COMMERCIAL

## 2025-08-01 DIAGNOSIS — Z95.5 S/P CORONARY ARTERY STENT PLACEMENT: ICD-10-CM

## 2025-08-01 PROCEDURE — 93798 PHYS/QHP OP CAR RHAB W/ECG: CPT | Performed by: INTERNAL MEDICINE

## 2025-08-02 LAB
CHOLEST SERPL-MCNC: 153 MG/DL
CHOLEST/HDLC SERPL: 3.8 (CALC)
HDLC SERPL-MCNC: 40 MG/DL
LDLC SERPL CALC-MCNC: 95 MG/DL (CALC)
NONHDLC SERPL-MCNC: 113 MG/DL (CALC)
TRIGL SERPL-MCNC: 90 MG/DL

## 2025-08-02 NOTE — PROGRESS NOTES
INDIVIDUAL CARDIAC TREATMENT PLAN-30 DAY REASSESSMENT     Name: Gabe Rdz    Today's Date: 25   : 1970    Primary Provider: AARON Mccann  MRN: [id number]    Referring Physician: STEFANIA Cartagena     Diagnosis: Stent x 1 LAD   Onset Date: 25   Risk Stratification: Moderate    NUTRITION REASSESSMENT   Lipids:   Lipid Lab Date: 25  Total Chol: 165  HDL: 42  LDL: 109  Tri  Cholesterol Med: Atorvastatin 80mg daily    Diabetes: No  HgbA1c: 5.5%  Date Checked: 10/13/25  Monitors glucose at home: No  Hypoglycemic Episode: Denies    Weight Management  Weight: 236 lbs  Height: 68 inches  BMI:  35.9  Pre Waist Circumference: 45.5 inches  Post Waist Circumference:  Current Diet: Low Sodium  Barriers to dietary change: Denies    Initial Dietary Assessment Score: 51/96  Discharge Dietary Assessment Score:       NUTRITION PLAN  Nutrition Goals:   1. Improve Picture Your Plate assessment results by discharge. Will retake prior to discharge.  2. Make changes to diet to include heart healthy options while in the program. Reviewed dietician recommendations with patient. Will reassess.     Nutrition Intervention/Education:   *Reviewed Dietician recommendations with patient. Dietician recommended patient aim to consume at least 5 fruits and vegetables per day. Fruit and Vegetable Tip Sheet provided. Dietician also recommended to increase intake of whole grains by replacing refined white grains with more whole grain products. Bread, Cereal, and Grain Tip Sheet provided.   *Education: Managing Cholesterol Levels and Individual Cholesterol Levels on 25.    OTHER CORE COMPONENTS/ RISK FACTORS REASSESSMENT   Medication compliance: good compliance  Using pill box: Yes  Carries medication list: Yes, in phone    Blood Pressure Management:  History of High BP: Yes  Resting BP: 108/70    Tobacco: FORMER  Form of tobacco: Cigarettes 1 pack/week x 20 yrs; very few cigars  Quit Date:  04  Anyone in the house smoke:  "No    Initial Knowledge Test Score: 12/15  Discharge Knowledge Test Score:    OTHER CORE COMPONENTS/ RISK FACTOR PLAN   Other Core components/Risk Factor Goals:                                                                                                                                                    1. Achieve and maintain a resting blood pressure less than 130/80 while in the program. Maintaining around goal.   2. Gain knowledge of cardiac disease and lifestyle modifications related to exercise and ADL's prior to discharge. Patient attends education. Will reassess at midway meeting.     Other Components/ Risk Factors Intervention/Education:  *Will continue to monitor HR, BP, dyspnea and arrhythmias each session.   *Will meet with patient to discuss goals & progress.  *Education: Hypertension on 7/23/25.    PSYCHOSOCIAL REASSESSMENT   Patient reported stress level: mild; work  Using stress management skills: Yes; meditation and deep breathing  HX of anxiety: No  HX of depression: No  Patient reported any new stress, depression and anxiety symptoms: No  Family/Support System: Wife, friends, co-workers  Seeing mental health provider: Pending; will start seeing counselor in August  Psychosocial medications: Denies    Initial PHQ-9 score: 2  [place] PHQ-9 score:  Discharge PHQ-9 score:   Was PHQ-9 sent to provider: No  (Score: None 0-4, Mild 5-9, Moderate 10-14, Mod-Severe 15-19, Severe 20-27)    Quality of Life Survey: SF-36 Pre Post   Physical Component Score 39.45 TBD   Mental Component Score 41.57 TBD     Stages of change:  Preparation    PSYCHOSOCIAL PLAN  Psychosocial Goals:  1. Improve stage of change from \"Preparation\" to \"Action\" while in the program. Patient is working hard each session will reassess at midway.   2. Increase PCS and MCS scores from SF 36 at discharge. Will retake prior to discharge.     Psychosocial Interventions/ Education:  *Education: Emotional Aspects of Heart Disease Part 1, 2 & 3 " on 7/30/25.    EXERCISE REASSESSMENT   Home Exercise: No  Frequency:   Mode:  *was going to gym 3-4 x weekly for 30 minutes cardio on bike; no exercise since stent    EXERCISE PLAN  Exercise Goals:   1. Goal of 7.3 METs by discharge.  Patient is currently working at 4.8 METs on the upright bike.   2. Have a plan in place for continued exercise after the program by discharge. Will go over plans at midway meeting.     Exercise Prescription:   Based on 12 Minute Walk Test on 7/8/25  Frequency: 3 days per week  Duration (total aerobic min.): 30 minutes  Intensity RPE: 11-14  Target HR: Rest + 30; (age-predicted: 112-146)  MET Level Range: 2-4.8     Modality METS Load  Duration   1 Warm Up    05:00   2 Upright Bike 4.8 96 bui Level 5 06:00   3 Upright Bike 4.8 96 bui Level 5 06:00   4 Treadmill 4.6 3 mph 3% 06:00   5 Elliptical 2  Level 1 06:00   6 Weights   lbs  06:00   7 Cool Down     05:00     Exercise Intervention/Education:   *Aim to progress 1 MET every 4 Weeks or as tolerated.  *Incorporate resistance training for muscular endurance and strength.  *Education: Resistance Training Part 1, 2 & Flexibility on 7/16/25.    Date of first exercise session: 7/8/25      LEARNING ASSESSMENT & BARRIERS  Readiness to Learn: Eager to learn  Barriers: reading glasses  Comments:    FALL RISK (based on assessment at orientation)  low    INDIVIDUAL PATIENT GOALS:   To learn exercise guidelines for best heart health while in the program.  2.    Learn heart healthy diet tips while in the program.    MEDICATIONS  AMLODIPINE BESYLATE 10MG DAILY, ASPIRIN 81MG DAILY, ATORVASTATIN CALCIUM 80MG DAILY, METOPROLOL SUCCINATE 50MG DAILY, MULTIVITAMIN 1 TAB DAILY, PRASUGEL 10MG DAILY     STAFF COMMENTS:   Patient has completed 11/36 sessions of Cardiac Rehabilitation. Tolerating workloads without reports of chest pain. Stable cardiovascular response to exercise. Cardiac Monitor shows SR-ST. Will scheduled meeting with patient one on  one to go over plans and progress.

## 2025-08-03 LAB
AORTIC VALVE PEAK VELOCITY: 1.34 M/S
AV PEAK GRADIENT: 7 MMHG
EJECTION FRACTION APICAL 4 CHAMBER: 59.3
EJECTION FRACTION: 58 %
LEFT ATRIUM VOLUME AREA LENGTH INDEX BSA: 14.7 ML/M2
LEFT VENTRICLE INTERNAL DIMENSION DIASTOLE: 3.75 CM (ref 3.5–6)
LEFT VENTRICULAR OUTFLOW TRACT DIAMETER: 2.13 CM
LV EJECTION FRACTION BIPLANE: 54 %
MITRAL VALVE E/A RATIO: 1.53
RIGHT VENTRICLE FREE WALL PEAK S': 12.94 CM/S
RIGHT VENTRICLE PEAK SYSTOLIC PRESSURE: 18 MMHG
TRICUSPID ANNULAR PLANE SYSTOLIC EXCURSION: 1.9 CM

## 2025-08-04 ENCOUNTER — CLINICAL SUPPORT (OUTPATIENT)
Dept: CARDIAC REHAB | Facility: CLINIC | Age: 55
End: 2025-08-04
Payer: COMMERCIAL

## 2025-08-04 DIAGNOSIS — Z95.5 S/P CORONARY ARTERY STENT PLACEMENT: ICD-10-CM

## 2025-08-04 PROCEDURE — 93798 PHYS/QHP OP CAR RHAB W/ECG: CPT | Performed by: INTERNAL MEDICINE

## 2025-08-06 ENCOUNTER — CLINICAL SUPPORT (OUTPATIENT)
Dept: CARDIAC REHAB | Facility: CLINIC | Age: 55
End: 2025-08-06
Payer: COMMERCIAL

## 2025-08-06 DIAGNOSIS — Z95.5 S/P CORONARY ARTERY STENT PLACEMENT: ICD-10-CM

## 2025-08-06 PROCEDURE — 93798 PHYS/QHP OP CAR RHAB W/ECG: CPT | Performed by: INTERNAL MEDICINE

## 2025-08-08 ENCOUNTER — CLINICAL SUPPORT (OUTPATIENT)
Dept: CARDIAC REHAB | Facility: CLINIC | Age: 55
End: 2025-08-08
Payer: COMMERCIAL

## 2025-08-08 DIAGNOSIS — Z95.5 S/P CORONARY ARTERY STENT PLACEMENT: ICD-10-CM

## 2025-08-08 PROCEDURE — 93798 PHYS/QHP OP CAR RHAB W/ECG: CPT | Performed by: INTERNAL MEDICINE

## 2025-08-11 ENCOUNTER — CLINICAL SUPPORT (OUTPATIENT)
Dept: CARDIAC REHAB | Facility: CLINIC | Age: 55
End: 2025-08-11
Payer: COMMERCIAL

## 2025-08-11 DIAGNOSIS — Z95.5 S/P CORONARY ARTERY STENT PLACEMENT: ICD-10-CM

## 2025-08-11 PROCEDURE — 93798 PHYS/QHP OP CAR RHAB W/ECG: CPT | Performed by: INTERNAL MEDICINE

## 2025-08-13 ENCOUNTER — CLINICAL SUPPORT (OUTPATIENT)
Dept: CARDIAC REHAB | Facility: CLINIC | Age: 55
End: 2025-08-13
Payer: COMMERCIAL

## 2025-08-13 DIAGNOSIS — Z95.5 S/P CORONARY ARTERY STENT PLACEMENT: ICD-10-CM

## 2025-08-13 PROCEDURE — 93798 PHYS/QHP OP CAR RHAB W/ECG: CPT | Performed by: INTERNAL MEDICINE

## 2025-08-15 ENCOUNTER — CLINICAL SUPPORT (OUTPATIENT)
Dept: CARDIAC REHAB | Facility: CLINIC | Age: 55
End: 2025-08-15
Payer: COMMERCIAL

## 2025-08-15 DIAGNOSIS — Z95.5 S/P CORONARY ARTERY STENT PLACEMENT: ICD-10-CM

## 2025-08-15 PROCEDURE — 93798 PHYS/QHP OP CAR RHAB W/ECG: CPT | Performed by: INTERNAL MEDICINE

## 2025-08-18 ENCOUNTER — CLINICAL SUPPORT (OUTPATIENT)
Dept: CARDIAC REHAB | Facility: CLINIC | Age: 55
End: 2025-08-18
Payer: COMMERCIAL

## 2025-08-18 DIAGNOSIS — Z95.5 S/P CORONARY ARTERY STENT PLACEMENT: ICD-10-CM

## 2025-08-18 PROCEDURE — 93798 PHYS/QHP OP CAR RHAB W/ECG: CPT | Performed by: INTERNAL MEDICINE

## 2025-08-20 ENCOUNTER — CLINICAL SUPPORT (OUTPATIENT)
Dept: CARDIAC REHAB | Facility: CLINIC | Age: 55
End: 2025-08-20
Payer: COMMERCIAL

## 2025-08-20 ENCOUNTER — APPOINTMENT (OUTPATIENT)
Dept: CARDIOLOGY | Facility: CLINIC | Age: 55
End: 2025-08-20
Payer: COMMERCIAL

## 2025-08-20 DIAGNOSIS — Z95.5 S/P CORONARY ARTERY STENT PLACEMENT: ICD-10-CM

## 2025-08-20 PROCEDURE — 93798 PHYS/QHP OP CAR RHAB W/ECG: CPT | Performed by: INTERNAL MEDICINE

## 2025-08-22 ENCOUNTER — CLINICAL SUPPORT (OUTPATIENT)
Dept: CARDIAC REHAB | Facility: CLINIC | Age: 55
End: 2025-08-22
Payer: COMMERCIAL

## 2025-08-22 DIAGNOSIS — Z95.5 S/P CORONARY ARTERY STENT PLACEMENT: ICD-10-CM

## 2025-08-22 PROCEDURE — 93798 PHYS/QHP OP CAR RHAB W/ECG: CPT | Performed by: INTERNAL MEDICINE

## 2025-08-25 ENCOUNTER — CLINICAL SUPPORT (OUTPATIENT)
Dept: CARDIAC REHAB | Facility: CLINIC | Age: 55
End: 2025-08-25
Payer: COMMERCIAL

## 2025-08-25 DIAGNOSIS — Z95.5 S/P CORONARY ARTERY STENT PLACEMENT: ICD-10-CM

## 2025-08-25 PROCEDURE — 93798 PHYS/QHP OP CAR RHAB W/ECG: CPT | Performed by: INTERNAL MEDICINE

## 2025-08-27 ENCOUNTER — CLINICAL SUPPORT (OUTPATIENT)
Dept: CARDIAC REHAB | Facility: CLINIC | Age: 55
End: 2025-08-27
Payer: COMMERCIAL

## 2025-08-27 DIAGNOSIS — Z95.5 S/P CORONARY ARTERY STENT PLACEMENT: ICD-10-CM

## 2025-08-27 PROCEDURE — 93798 PHYS/QHP OP CAR RHAB W/ECG: CPT | Performed by: INTERNAL MEDICINE

## 2025-08-29 ENCOUNTER — CLINICAL SUPPORT (OUTPATIENT)
Dept: CARDIAC REHAB | Facility: CLINIC | Age: 55
End: 2025-08-29
Payer: COMMERCIAL

## 2025-08-29 DIAGNOSIS — Z95.5 S/P CORONARY ARTERY STENT PLACEMENT: ICD-10-CM

## 2025-08-29 PROCEDURE — 93798 PHYS/QHP OP CAR RHAB W/ECG: CPT | Performed by: INTERNAL MEDICINE

## 2025-09-03 ENCOUNTER — CLINICAL SUPPORT (OUTPATIENT)
Dept: CARDIAC REHAB | Facility: CLINIC | Age: 55
End: 2025-09-03
Payer: COMMERCIAL

## 2025-09-03 DIAGNOSIS — Z95.5 S/P CORONARY ARTERY STENT PLACEMENT: ICD-10-CM

## 2025-09-03 PROCEDURE — 93798 PHYS/QHP OP CAR RHAB W/ECG: CPT | Performed by: INTERNAL MEDICINE

## 2025-09-04 ENCOUNTER — APPOINTMENT (OUTPATIENT)
Dept: CARDIOLOGY | Facility: CLINIC | Age: 55
End: 2025-09-04
Payer: COMMERCIAL

## 2025-09-04 PROBLEM — R94.39 POSITIVE CARDIAC STRESS TEST: Status: RESOLVED | Noted: 2025-07-25 | Resolved: 2025-09-04

## 2025-09-05 ENCOUNTER — CLINICAL SUPPORT (OUTPATIENT)
Dept: CARDIAC REHAB | Facility: CLINIC | Age: 55
End: 2025-09-05
Payer: COMMERCIAL

## 2025-09-05 DIAGNOSIS — Z95.5 S/P CORONARY ARTERY STENT PLACEMENT: ICD-10-CM

## 2025-09-05 PROCEDURE — 93798 PHYS/QHP OP CAR RHAB W/ECG: CPT | Performed by: INTERNAL MEDICINE

## 2026-01-27 ENCOUNTER — APPOINTMENT (OUTPATIENT)
Dept: CARDIOLOGY | Facility: CLINIC | Age: 56
End: 2026-01-27
Payer: COMMERCIAL

## (undated) DEVICE — CATHETER, DIAGNOSTIC, 5FR, IM

## (undated) DEVICE — CATHETER, BALLOON, EMERGE, PTCA, 12 X 2.0MM

## (undated) DEVICE — INFLATION DEVICE, BASIXCOMPAX, 30 ATM/BAR, 20ML, MAP152, 12IN TUBING

## (undated) DEVICE — CATHETER, DIAGNOSTIC, JUDKINS, LEFT, 5 FR-JL 3.5

## (undated) DEVICE — Device

## (undated) DEVICE — CATHETER, EAGLE EYE, PLATNIUM (IVUS)

## (undated) DEVICE — ANGIOPLASTY PACK, HEMOSTASIS VALVE PHD, SMALL BORE

## (undated) DEVICE — CATHETER, BALLOON, NC EUPHORA NONCOMPLIANT 3.5 X 12 X 142CM

## (undated) DEVICE — TUBING, PRESSURE MONITOR, 24IN/61CM, FIXED FEMALE LUER

## (undated) DEVICE — SHEATH, PINNACLE, W/.038 GUIDEWIRE, 10 CM,  6FR INTRODUCER, 6FR DIA, 2.5 CM DIALATOR

## (undated) DEVICE — CLOSURE DEVICE, VASCULAR, ANGIO-SEAL, VIP, 6FR, LF

## (undated) DEVICE — TR BAND, RADIAL COMPRESSION, STANDARD, 24CM

## (undated) DEVICE — MBRACE, WRIST SUPPORT WITH HOOK

## (undated) DEVICE — CATHETER, DIAGNOSTIC, 5FR,  PIG-145, 110CM, 6SH ANGLED

## (undated) DEVICE — GUIDEWIRE, HI-TORQUE WHISPER MS, .014IN/3CM STRAIGHT TIP/190CM

## (undated) DEVICE — SYRINGE, VACLOK, 20ML, FIXED MALE LUER

## (undated) DEVICE — GLIDEWIRE, STIFF SHAFT, ANGLE TIP, .035 DIA, 180 CM,  3 CM TIP"

## (undated) DEVICE — CATHETER, OPTITORQUE, 5FR, JACKY, 3.5/ 2H/110CM, CURVED

## (undated) DEVICE — CATHETER, INFINITI DIAGNOSTIC, 5 FR 100CM 3DRC, WILLIAMS RIGHT OR NO TORQUE

## (undated) DEVICE — MANIFOLD KIT, CUSTOM (SJM)

## (undated) DEVICE — CATHETER, VISTA BRITE TIP GUIDE, 6FR 0.070 XB 3.5 ECO

## (undated) DEVICE — CUP, MEDICINE, CLEAR, 2 OZ, STERILE

## (undated) DEVICE — SHEATH, GLIDESHEATH, SLENDER, 6FR 10CM

## (undated) DEVICE — ACCESS KIT, MINI MAK, 4 FR X 10CM, 0.018 X 40CM, NITINOL/PLATINUM, STD NEEDLE